# Patient Record
Sex: FEMALE | Race: BLACK OR AFRICAN AMERICAN | Employment: UNEMPLOYED | URBAN - METROPOLITAN AREA
[De-identification: names, ages, dates, MRNs, and addresses within clinical notes are randomized per-mention and may not be internally consistent; named-entity substitution may affect disease eponyms.]

---

## 2019-04-11 ENCOUNTER — OFFICE VISIT (OUTPATIENT)
Dept: FAMILY MEDICINE CLINIC | Age: 84
End: 2019-04-11

## 2019-04-11 VITALS
TEMPERATURE: 98.4 F | HEART RATE: 60 BPM | HEIGHT: 62 IN | RESPIRATION RATE: 18 BRPM | WEIGHT: 162 LBS | DIASTOLIC BLOOD PRESSURE: 72 MMHG | BODY MASS INDEX: 29.81 KG/M2 | OXYGEN SATURATION: 99 % | SYSTOLIC BLOOD PRESSURE: 140 MMHG

## 2019-04-11 DIAGNOSIS — F41.9 CHRONIC ANXIETY: ICD-10-CM

## 2019-04-11 DIAGNOSIS — S01.01XA LACERATION OF OCCIPITAL SCALP, INITIAL ENCOUNTER: ICD-10-CM

## 2019-04-11 DIAGNOSIS — Z76.89 ENCOUNTER TO ESTABLISH CARE: Primary | ICD-10-CM

## 2019-04-11 DIAGNOSIS — E03.9 ACQUIRED HYPOTHYROIDISM: ICD-10-CM

## 2019-04-11 DIAGNOSIS — I10 ESSENTIAL HYPERTENSION: ICD-10-CM

## 2019-04-11 RX ORDER — FLUOXETINE HYDROCHLORIDE 20 MG/1
20 CAPSULE ORAL
COMMUNITY
End: 2021-09-20 | Stop reason: SDUPTHER

## 2019-04-11 RX ORDER — LOSARTAN POTASSIUM 100 MG/1
100 TABLET ORAL
COMMUNITY
End: 2021-11-03 | Stop reason: SDUPTHER

## 2019-04-11 RX ORDER — DILTIAZEM HYDROCHLORIDE 240 MG/1
240 CAPSULE, COATED, EXTENDED RELEASE ORAL
COMMUNITY
End: 2021-11-03 | Stop reason: SDUPTHER

## 2019-04-11 RX ORDER — HYDRALAZINE HYDROCHLORIDE 25 MG/1
25 TABLET, FILM COATED ORAL
COMMUNITY
End: 2021-09-20 | Stop reason: SDUPTHER

## 2019-04-11 RX ORDER — FUROSEMIDE 20 MG/1
20 TABLET ORAL
COMMUNITY
Start: 2012-05-24 | End: 2021-11-03 | Stop reason: SDUPTHER

## 2019-04-11 RX ORDER — LORATADINE 10 MG/1
10 TABLET ORAL
COMMUNITY
End: 2022-01-26 | Stop reason: ALTCHOICE

## 2019-04-11 RX ORDER — LORAZEPAM 0.5 MG/1
0.5 TABLET ORAL
COMMUNITY
End: 2021-06-18 | Stop reason: SDUPTHER

## 2019-04-11 RX ORDER — LEVOTHYROXINE SODIUM 75 UG/1
75 TABLET ORAL
COMMUNITY
End: 2022-01-26 | Stop reason: SDUPTHER

## 2019-04-11 RX ORDER — GUAIFENESIN 100 MG/5ML
81 LIQUID (ML) ORAL
COMMUNITY

## 2019-04-11 RX ORDER — CALCIUM CARBONATE 750 MG/1
1 TABLET, CHEWABLE ORAL DAILY
COMMUNITY
End: 2021-01-05

## 2019-04-11 RX ORDER — ACETAMINOPHEN 325 MG/1
650 TABLET ORAL
COMMUNITY
Start: 2019-03-28 | End: 2021-01-05

## 2019-04-11 NOTE — PROGRESS NOTES
HISTORY OF PRESENT ILLNESS Nikhil Perry is a 80 y.o. female. Establish Care The history is provided by the patient. Pertinent negatives include no chest pain, no abdominal pain, no headaches and no shortness of breath. Ms. Deuce Martniez was brought here today by her son who would like her to move here, however she prefers to stay in [facility] where she has a primary care provider. She reports that about 2 weeks ago she fell and sustained a laceration to the back of her head with bleeding that apparently was not able to be controlled at the local hospital and she was transported to [facility]. She reports that a head CT scan there was unremarkable and she was advised to follow-up with her primary care provider. Mr#: [id number] Past Medical History:  
Diagnosis Date  COPD (chronic obstructive pulmonary disease) (HCC)  Depression  Hypertension  Hypertension  Shortness of breath  Thyroid disease  Vertigo Past Surgical History:  
Procedure Laterality Date  HX BREAST BIOPSY  HX CATARACT REMOVAL    
 HX HYSTERECTOMY  HX KNEE REPLACEMENT Left 2016 No family history on file. Allergies Allergen Reactions  Pcn [Penicillins] Rash Social History Tobacco Use Smoking Status Never Smoker Smokeless Tobacco Never Used Social History Substance and Sexual Activity Alcohol Use Yes  Frequency: Monthly or less Health Maintenance Review: 
Colonoscopy - 3-4 years ago, advised 10 year follow up Mammogram - 12/2018 Pap Smear - N/A 
DEXA scan - ? Glaucoma check - 3 years Immunizations: 
Tetanus - ? Influenza - Current PCV - 13 - Reported current PPSV - 23 - 
HZV - ? Medicare wellness evaluation -? Patient Active Problem List  
Diagnosis Code  Essential hypertension I10  
 Acquired hypothyroidism E03.9 Current Outpatient Medications:   acetaminophen (TYLENOL) 325 mg tablet, Take 650 mg by mouth., Disp: , Rfl:  
  aspirin 81 mg chewable tablet, Take 81 mg by mouth., Disp: , Rfl:  
  hydrALAZINE (APRESOLINE) 25 mg tablet, Take 25 mg by mouth., Disp: , Rfl:  
  LORazepam (ATIVAN) 0.5 mg tablet, Take 0.5 mg by mouth., Disp: , Rfl:  
  FLUoxetine (PROZAC) 20 mg capsule, Take 20 mg by mouth., Disp: , Rfl:  
  losartan (COZAAR) 100 mg tablet, Take 100 mg by mouth., Disp: , Rfl:  
  levothyroxine (SYNTHROID) 75 mcg tablet, Take 75 mcg by mouth., Disp: , Rfl:  
  furosemide (LASIX) 20 mg tablet, Take 20 mg by mouth., Disp: , Rfl:  
  loratadine (CLARITIN) 10 mg tablet, Take 10 mg by mouth., Disp: , Rfl:  
  folic acid-vit R4-MILAGROS U88 (FOLBIC) 2.5-25-2 mg tablet, TAKE ONE TABLET BY MOUTH TWICE DAILY, Disp: , Rfl:  
  dilTIAZem CD (CARTIA XT) 240 mg ER capsule, Take 240 mg by mouth., Disp: , Rfl:  
  calcium carbonate (TUMS) 300 mg (750 mg) chewable tablet, Take 1 Tab by mouth daily. , Disp: , Rfl:  
   
 
 
Review of Systems Constitutional: Negative for chills, fever and weight loss. HENT: Positive for hearing loss (left ear). Chronic hoarseness Eyes: Negative for blurred vision and double vision. Cataract surgery Respiratory: Negative for cough, shortness of breath and wheezing. Cardiovascular: Negative for chest pain, palpitations and leg swelling. Gastrointestinal: Negative for abdominal pain, blood in stool, constipation, diarrhea, heartburn, melena, nausea and vomiting. Genitourinary: Negative for dysuria and urgency. Musculoskeletal: Positive for joint pain (left knee arthroplasty 12/2016). Negative for myalgias. Skin: Negative for itching and rash. Neurological: Negative for dizziness, tingling, sensory change, focal weakness and headaches. Endo/Heme/Allergies: Negative for environmental allergies. Psychiatric/Behavioral: Negative for depression.  The patient is nervous/anxious. The patient does not have insomnia. Visit Vitals /72 (BP 1 Location: Left arm, BP Patient Position: Sitting) Pulse 60 Temp 98.4 °F (36.9 °C) (Oral) Resp 18 Ht 5' 2.01\" (1.575 m) Wt 162 lb (73.5 kg) SpO2 99% BMI 29.62 kg/m² Physical Exam  
Constitutional: She is oriented to person, place, and time. She appears well-developed and well-nourished. HENT:  
Right Ear: Tympanic membrane and ear canal normal.  
Left Ear: Tympanic membrane and ear canal normal.  
Mouth/Throat: Oropharynx is clear and moist.  
Area covered with a thick scab left occiput. Eyes: Pupils are equal, round, and reactive to light. Conjunctivae and EOM are normal.  
Neck: Neck supple. Cardiovascular: Normal rate, regular rhythm, normal heart sounds and intact distal pulses. Pulmonary/Chest: Effort normal and breath sounds normal.  
Abdominal: Soft. Bowel sounds are normal. She exhibits no mass. There is no tenderness. Musculoskeletal: She exhibits no edema. Neurological: She is alert and oriented to person, place, and time. She has normal reflexes. Skin: Skin is warm and dry. Psychiatric: She has a normal mood and affect. Her behavior is normal.  
Nursing note and vitals reviewed. Carli Kimo reports that she believes she has had recent lab studies with her primary care provider in [facility]. ASSESSMENT and PLAN 
  ICD-10-CM ICD-9-CM 1. Encounter to establish care Z76.89 V65.8 2. Essential hypertension I10 401.9 3. Acquired hypothyroidism E03.9 244.9 4. Chronic anxiety F41.9 300.00   
5. Laceration of occipital scalp, initial encounter S01. 01XA 873.0 She was advised that in my opinion she would be safe for decreasing her dose of lorazepam. 
She is currently taking 0.5 mg 3 times a day on a regular basis. I am concerned that this increases her risk of falling. And would recommend she start to taper downward by first taking only 0.5 mg twice daily. Further advised that I would be happy to provide primary care for her when she moved to this area but in the meantime believe she should maintain a relationship with her primary care provider where she lives in [place] especially since she lives alone there.

## 2019-04-11 NOTE — PATIENT INSTRUCTIONS
Well Visit, Over 72: Care Instructions Your Care Instructions Physical exams can help you stay healthy. Your doctor has checked your overall health and may have suggested ways to take good care of yourself. He or she also may have recommended tests. At home, you can help prevent illness with healthy eating, regular exercise, and other steps. Follow-up care is a key part of your treatment and safety. Be sure to make and go to all appointments, and call your doctor if you are having problems. It's also a good idea to know your test results and keep a list of the medicines you take. How can you care for yourself at home? · Reach and stay at a healthy weight. This will lower your risk for many problems, such as obesity, diabetes, heart disease, and high blood pressure. · Get at least 30 minutes of exercise on most days of the week. Walking is a good choice. You also may want to do other activities, such as running, swimming, cycling, or playing tennis or team sports. · Do not smoke. Smoking can make health problems worse. If you need help quitting, talk to your doctor about stop-smoking programs and medicines. These can increase your chances of quitting for good. · Protect your skin from too much sun. When you're outdoors from 10 a.m. to 4 p.m., stay in the shade or cover up with clothing and a hat with a wide brim. Wear sunglasses that block UV rays. Even when it's cloudy, put broad-spectrum sunscreen (SPF 30 or higher) on any exposed skin. · See a dentist one or two times a year for checkups and to have your teeth cleaned. · Wear a seat belt in the car. · Limit alcohol to 2 drinks a day for men and 1 drink a day for women. Too much alcohol can cause health problems. Follow your doctor's advice about when to have certain tests. These tests can spot problems early. For men and women · Cholesterol.  Your doctor will tell you how often to have this done based on your overall health and other things that can increase your risk for heart attack and stroke. · Blood pressure. Have your blood pressure checked during a routine doctor visit. Your doctor will tell you how often to check your blood pressure based on your age, your blood pressure results, and other factors. · Diabetes. Ask your doctor whether you should have tests for diabetes. · Vision. Experts recommend that you have yearly exams for glaucoma and other age-related eye problems. · Hearing. Tell your doctor if you notice any change in your hearing. You can have tests to find out how well you hear. · Colon cancer tests. Keep having colon cancer tests as your doctor recommends. You can have one of several types of tests. · Heart attack and stroke risk. At least every 4 to 6 years, you should have your risk for heart attack and stroke assessed. Your doctor uses factors such as your age, blood pressure, cholesterol, and whether you smoke or have diabetes to show what your risk for a heart attack or stroke is over the next 10 years. · Osteoporosis. Talk to your doctor about whether you should have a bone density test to find out whether you have thinning bones. Also ask your doctor about whether you should take calcium and vitamin D supplements. For women · Pap test and pelvic exam. You may no longer need a Pap test. Talk with your doctor about whether to stop or continue to have Pap tests. · Breast exam and mammogram. Ask how often you should have a mammogram, which is an X-ray of your breasts. A mammogram can spot breast cancer before it can be felt and when it is easiest to treat. · Thyroid disease. Talk to your doctor about whether to have your thyroid checked as part of a regular physical exam. Women have an increased chance of a thyroid problem. For men · Prostate exam. Talk to your doctor about whether you should have a blood test (called a PSA test) for prostate cancer.  Experts disagree on whether men should have this test. Some experts recommend that you discuss the benefits and risks of the test with your doctor. · Abdominal aortic aneurysm. Ask your doctor whether you should have a test to check for an aneurysm. You may need a test if you ever smoked or if your parent, brother, sister, or child has had an aneurysm. When should you call for help? Watch closely for changes in your health, and be sure to contact your doctor if you have any problems or symptoms that concern you. Where can you learn more? Go to http://nieves-kathrin.info/. Enter L620 in the search box to learn more about \"Well Visit, Over 65: Care Instructions. \" Current as of: March 28, 2018 Content Version: 11.9 © 0250-4241 [employer]. Care instructions adapted under license by [employer] (which disclaims liability or warranty for this information). If you have questions about a medical condition or this instruction, always ask your healthcare professional. [place] [address] any warranty or liability for your use of this information. High Blood Pressure: Care Instructions Overview It's normal for blood pressure to go up and down throughout the day. But if it stays up, you have high blood pressure. Another name for high blood pressure is hypertension. Despite what a lot of people think, high blood pressure usually doesn't cause headaches or make you feel dizzy or lightheaded. It usually has no symptoms. But it does increase your risk of stroke, heart attack, and other problems. You and your doctor will talk about your risks of these problems based on your blood pressure. Your doctor will give you a goal for your blood pressure. Your goal will be based on your health and your age. Lifestyle changes, such as eating healthy and being active, are always important to help lower blood pressure.  You might also take medicine to reach your blood pressure goal. 
 Follow-up care is a key part of your treatment and safety. Be sure to make and go to all appointments, and call your doctor if you are having problems. It's also a good idea to know your test results and keep a list of the medicines you take. How can you care for yourself at home? Medical treatment · If you stop taking your medicine, your blood pressure will go back up. You may take one or more types of medicine to lower your blood pressure. Be safe with medicines. Take your medicine exactly as prescribed. Call your doctor if you think you are having a problem with your medicine. · Talk to your doctor before you start taking aspirin every day. Aspirin can help certain people lower their risk of a heart attack or stroke. But taking aspirin isn't right for everyone, because it can cause serious bleeding. · See your doctor regularly. You may need to see the doctor more often at first or until your blood pressure comes down. · If you are taking blood pressure medicine, talk to your doctor before you take decongestants or anti-inflammatory medicine, such as ibuprofen. Some of these medicines can raise blood pressure. · Learn how to check your blood pressure at home. Lifestyle changes · Stay at a healthy weight. This is especially important if you put on weight around the waist. Losing even 10 pounds can help you lower your blood pressure. · If your doctor recommends it, get more exercise. Walking is a good choice. Bit by bit, increase the amount you walk every day. Try for at least 30 minutes on most days of the week. You also may want to swim, bike, or do other activities. · Avoid or limit alcohol. Talk to your doctor about whether you can drink any alcohol. · Try to limit how much sodium you eat to less than 2,300 milligrams (mg) a day. Your doctor may ask you to try to eat less than 1,500 mg a day.  
· Eat plenty of fruits (such as bananas and oranges), vegetables, legumes, whole grains, and low-fat dairy products. · Lower the amount of saturated fat in your diet. Saturated fat is found in animal products such as milk, cheese, and meat. Limiting these foods may help you lose weight and also lower your risk for heart disease. · Do not smoke. Smoking increases your risk for heart attack and stroke. If you need help quitting, talk to your doctor about stop-smoking programs and medicines. These can increase your chances of quitting for good. When should you call for help? Call 911 anytime you think you may need emergency care. This may mean having symptoms that suggest that your blood pressure is causing a serious heart or blood vessel problem. Your blood pressure may be over 180/120. 
 For example, call 911 if: 
  · You have symptoms of a heart attack. These may include: 
? Chest pain or pressure, or a strange feeling in the chest. 
? Sweating. ? Shortness of breath. ? Nausea or vomiting. ? Pain, pressure, or a strange feeling in the back, neck, jaw, or upper belly or in one or both shoulders or arms. ? Lightheadedness or sudden weakness. ? A fast or irregular heartbeat.  
  · You have symptoms of a stroke. These may include: 
? Sudden numbness, tingling, weakness, or loss of movement in your face, arm, or leg, especially on only one side of your body. ? Sudden vision changes. ? Sudden trouble speaking. ? Sudden confusion or trouble understanding simple statements. ? Sudden problems with walking or balance. ? A sudden, severe headache that is different from past headaches.  
  · You have severe back or belly pain.  
 Do not wait until your blood pressure comes down on its own. Get help right away. 
 Call your doctor now or seek immediate care if: 
  · Your blood pressure is much higher than normal (such as 180/120 or higher), but you don't have symptoms.  
  · You think high blood pressure is causing symptoms, such as: 
? Severe headache. 
? Blurry vision.  Watch closely for changes in your health, and be sure to contact your doctor if: 
  · Your blood pressure measures higher than your doctor recommends at least 2 times. That means the top number is higher or the bottom number is higher, or both.  
  · You think you may be having side effects from your blood pressure medicine. Where can you learn more? Go to http://nieves-kathrin.info/. Enter N830 in the search box to learn more about \"High Blood Pressure: Care Instructions. \" Current as of: July 22, 2018 Content Version: 11.9 © 5171-8951 [employer]. Care instructions adapted under license by [employer] (which disclaims liability or warranty for this information). If you have questions about a medical condition or this instruction, always ask your healthcare professional. [address] any warranty or liability for your use of this information. When You Are Overweight: Care Instructions Your Care Instructions If you're overweight, your doctor may recommend that you make changes in your eating and exercise habits. Being overweight can lead to serious health problems, such as high blood pressure, heart disease, type 2 diabetes, and arthritis, or it can make these problems worse. Eating a healthy diet and being more active can help you reach and stay at a healthy weight. You don't have to make huge changes all at once. Start by making small changes in your eating and exercise habits. To lose weight, you need to burn more calories than you take in. You can do this by eating healthy foods in reasonable amounts and becoming more active every day. Follow-up care is a key part of your treatment and safety. Be sure to make and go to all appointments, and call your doctor if you are having problems. It's also a good idea to know your test results and keep a list of the medicines you take. How can you care for yourself at home? · Improve your eating habits. You'll be more successful if you work on changing one eating habit at a time. All foods, if eaten in moderation, can be part of healthy eating. Remember to: 
? Eat a variety of foods from each food group. Include grains, vegetables, fruits, dairy, and protein foods. ? Limit foods high in fat, sugar, and calories. ? Eat slowly. And don't do anything else, such as watch TV, while you are eating. ? Pay attention to portion sizes. Put your food on a smaller plate. ? Plan your meals ahead of time. You'll be less likely to grab something that's not as healthy. · Get active. Regular activity can help you feel better, have more energy, and burn more calories. If you haven't been active, start slowly. Start with at least 30 minutes of moderate activity on most days of the week. Then gradually increase the amount of activity. Try for 60 or 90 minutes a day, at least 5 days a week. There are a lot of ways to fit activity into your life. You can: 
? Walk or bike to the store. Or walk with a friend, or walk the dog. 
? Mow the lawn, rake leaves, shovel snow, or do some gardening. ? Use the stairs instead of the elevator, at least for a few floors. · Change your thinking. Your thoughts have a lot to do with how you feel and what you do. When you're trying to reach a healthy weight, changing how you think about certain things may help. Here are some ideas: 
? Don't compare yourself to others. Healthy bodies come in all shapes and sizes. ? Pay attention to how hungry or full you feel. When you eat, be aware of why you're eating and how much you're eating. ? Focus on improving your health instead of dieting. Dieting almost never works over the long term. · Ask your doctor about other health professionals who can help you reach a healthy weight. ? A dietitian can help you make healthy changes in your diet. ?  An exercise specialist or  can help you develop a safe and effective exercise program. 
? A counselor or psychiatrist can help you cope with issues such as depression, anxiety, or family problems that can make it hard to focus on reaching a healthy weight. · Get support from your family, your doctor, your friends, a support groupand support yourself. Where can you learn more? Go to http://nieves-kathrin.info/. Enter K837 in the search box to learn more about \"When You Are Overweight: Care Instructions. \" Current as of: June 25, 2018 Content Version: 11.9 © 9020-5529 [email or web address]. Care instructions adapted under license by [employer] (which disclaims liability or warranty for this information). If you have questions about a medical condition or this instruction, always ask your healthcare professional. [place] [address] any warranty or liability for your use of this information. Learning About Low-Carbohydrate Diets for Weight Loss What is a low-carbohydrate diet? Low-carb diets avoid foods that are high in carbohydrate. These high-carb foods include pasta, bread, rice, cereal, fruits, and starchy vegetables. Instead, these diets usually have you eat foods that are high in fat and protein. Many people lose weight quickly on a low-carb diet. But the early weight loss is water. People on this diet often gain the weight back after they start eating carbs again. Not all diet plans are safe or work well. A lot of the evidence shows that low-carb diets aren't healthy. That's because these diets often don't include healthy foods like fruits and vegetables. Losing weight safely means balancing protein, fat, and carbs with every meal and snack. And low-carb diets don't always provide the vitamins, minerals, and fiber you need. If you have a serious medical condition, talk to your doctor before you try any diet.  These conditions include kidney disease, heart disease, type 2 diabetes, high cholesterol, and high blood pressure. If you are pregnant, it may not be safe for your baby if you are on a low-carb diet. How can you lose weight safely? You might have heard that a diet plan helped another person lose weight. But that doesn't mean that it will work for you. It is very hard to stay on a diet that includes lots of big changes in your eating habits. If you want to get to a healthy weight and stay there, making healthy lifestyle changes will often work better than dieting. These steps can help. · Make a plan for change. Work with your doctor to create a plan that is right for you. · See a dietitian. He or she can show you how to make healthy changes in your eating habits. · Manage stress. If you have a lot of stress in your life, it can be hard to focus on making healthy changes to your daily habits. · Track your food and activity. You are likely to do better at losing weight if you keep track of what you eat and what you do. Follow-up care is a key part of your treatment and safety. Be sure to make and go to all appointments, and call your doctor if you are having problems. It's also a good idea to know your test results and keep a list of the medicines you take. Where can you learn more? Go to http://nieves-kathrin.info/. Enter A121 in the search box to learn more about \"Learning About Low-Carbohydrate Diets for Weight Loss. \" Current as of: March 28, 2018 Content Version: 11.9 © 2649-7760 [employer]. Care instructions adapted under license by [employer] (which disclaims liability or warranty for this information). If you have questions about a medical condition or this instruction, always ask your healthcare professional. [place] [address] any warranty or liability for your use of this information. Low Sodium Diet (2,000 Milligram): Care Instructions Your Care Instructions Too much sodium causes your body to hold on to extra water. This can raise your blood pressure and force your heart and kidneys to work harder. In very serious cases, this could cause you to be put in the hospital. It might even be life-threatening. By limiting sodium, you will feel better and lower your risk of serious problems. The most common source of sodium is salt. People get most of the salt in their diet from canned, prepared, and packaged foods. Fast food and restaurant meals also are very high in sodium. Your doctor will probably limit your sodium to less than 2,000 milligrams (mg) a day. This limit counts all the sodium in prepared and packaged foods and any salt you add to your food. Follow-up care is a key part of your treatment and safety. Be sure to make and go to all appointments, and call your doctor if you are having problems. It's also a good idea to know your test results and keep a list of the medicines you take. How can you care for yourself at home? Read food labels · Read labels on cans and food packages. The labels tell you how much sodium is in each serving. Make sure that you look at the serving size. If you eat more than the serving size, you have eaten more sodium. · Food labels also tell you the Percent Daily Value for sodium. Choose products with low Percent Daily Values for sodium. · Be aware that sodium can come in forms other than salt, including monosodium glutamate (MSG), sodium citrate, and sodium bicarbonate (baking soda). MSG is often added to Asian food. When you eat out, you can sometimes ask for food without MSG or added salt. Buy low-sodium foods · Buy foods that are labeled \"unsalted\" (no salt added), \"sodium-free\" (less than 5 mg of sodium per serving), or \"low-sodium\" (less than 140 mg of sodium per serving). Foods labeled \"reduced-sodium\" and \"light sodium\" may still have too much sodium. Be sure to read the label to see how much sodium you are getting. · Buy fresh vegetables, or frozen vegetables without added sauces. Buy low-sodium versions of canned vegetables, soups, and other canned goods. Prepare low-sodium meals · Cut back on the amount of salt you use in cooking. This will help you adjust to the taste. Do not add salt after cooking. One teaspoon of salt has about 2,300 mg of sodium. · Take the salt shaker off the table. · Flavor your food with garlic, lemon juice, onion, vinegar, herbs, and spices. Do not use soy sauce, lite soy sauce, steak sauce, onion salt, garlic salt, celery salt, mustard, or ketchup on your food. · Use low-sodium salad dressings, sauces, and ketchup. Or make your own salad dressings and sauces without adding salt. · Use less salt (or none) when recipes call for it. You can often use half the salt a recipe calls for without losing flavor. Other foods such as rice, pasta, and grains do not need added salt. · Rinse canned vegetables, and cook them in fresh water. This removes somebut not allof the salt. · Avoid water that is naturally high in sodium or that has been treated with water softeners, which add sodium. Call your local water company to find out the sodium content of your water supply. If you buy bottled water, read the label and choose a sodium-free brand. Avoid high-sodium foods · Avoid eating: 
? Smoked, cured, salted, and canned meat, fish, and poultry. ? Ham, aparicio, hot dogs, and luncheon meats. ? Regular, hard, and processed cheese and regular peanut butter. ? Crackers with salted tops, and other salted snack foods such as pretzels, chips, and salted popcorn. ? Frozen prepared meals, unless labeled low-sodium. ? Canned and dried soups, broths, and bouillon, unless labeled sodium-free or low-sodium. ? Canned vegetables, unless labeled sodium-free or low-sodium. ? [place] fries, pizza, tacos, and other fast foods.  
? Pickles, olives, ketchup, and other condiments, especially soy sauce, unless labeled sodium-free or low-sodium. Where can you learn more? Go to http://nieves-kathrin.info/. Enter R372 in the search box to learn more about \"Low Sodium Diet (2,000 Milligram): Care Instructions. \" Current as of: March 28, 2018 Content Version: 11.9 © 1505-5741 [employer]. Care instructions adapted under license by [employer] (which disclaims liability or warranty for this information). If you have questions about a medical condition or this instruction, always ask your healthcare professional. [address] any warranty or liability for your use of this information.

## 2019-04-11 NOTE — PROGRESS NOTES
Elvira Goodrich is a 80 y.o. female (: 1935) presenting to address: Chief Complaint Patient presents with  New Patient [facility] Establish Care Vitals:  
 19 1259 BP: 140/72 Pulse: 60 Resp: 18 Temp: 98.4 °F (36.9 °C) TempSrc: Oral  
SpO2: 99% Weight: 162 lb (73.5 kg) Height: 5' 2.01\" (1.575 m) PainSc:   0 - No pain Hearing/Vision: No exam data present Learning Assessment:  
 
Learning Assessment 2019 PRIMARY LEARNER Patient HIGHEST LEVEL OF EDUCATION - PRIMARY LEARNER  DID NOT GRADUATE HIGH SCHOOL  
BARRIERS PRIMARY LEARNER NONE  
CO-LEARNER CAREGIVER No  
PRIMARY LANGUAGE ENGLISH  
LEARNER PREFERENCE PRIMARY OTHER (COMMENT) ANSWERED BY patient RELATIONSHIP SELF Depression Screening:  
 
3 most recent PHQ Screens 2019 Little interest or pleasure in doing things Not at all Feeling down, depressed, irritable, or hopeless Not at all Total Score PHQ 2 0 Fall Risk Assessment:  
 
Fall Risk Assessment, last 12 mths 2019 Able to walk? Yes Fall in past 12 months? Yes Fall with injury? Yes  
Number of falls in past 12 months 2 Fall Risk Score 3 Abuse Screening:  
 
Abuse Screening Questionnaire 2019 Do you ever feel afraid of your partner? Bushra Baez Are you in a relationship with someone who physically or mentally threatens you? Bushra Baez Is it safe for you to go home? Ariel Berumen Coordination of Care Questionaire: 1. Have you been to the ER, urgent care clinic since your last visit? Hospitalized since your last visit? NO 
 
2. Have you seen or consulted any other health care providers outside of the [address] [place] [address] since your last visit? Include any pap smears or colon screening. NO Advanced Directive: 1. Do you have an Advanced Directive? NO 
 
2. Would you like information on Advanced Directives?  NO

## 2020-06-23 LAB — SARS-COV-2 AB, IGG, CORG1M: NEGATIVE

## 2021-01-05 ENCOUNTER — VIRTUAL VISIT (OUTPATIENT)
Dept: FAMILY MEDICINE CLINIC | Age: 86
End: 2021-01-05

## 2021-01-05 DIAGNOSIS — Z00.00 MEDICARE ANNUAL WELLNESS VISIT, SUBSEQUENT: Primary | ICD-10-CM

## 2021-01-05 DIAGNOSIS — R41.3 MEMORY PROBLEM: ICD-10-CM

## 2021-01-05 DIAGNOSIS — E03.9 ACQUIRED HYPOTHYROIDISM: ICD-10-CM

## 2021-01-05 DIAGNOSIS — I10 ESSENTIAL HYPERTENSION: ICD-10-CM

## 2021-01-05 RX ORDER — CHOLECALCIFEROL (VITAMIN D3) 50 MCG
1000 CAPSULE ORAL DAILY
COMMUNITY
End: 2021-03-29

## 2021-01-05 NOTE — PROGRESS NOTES
Kaleb Torres is a 80 y.o. female (: 1935) presenting to address:    Chief Complaint   Patient presents with    Memory Loss       There were no vitals filed for this visit. Hearing/Vision:   No exam data present    Learning Assessment:     Learning Assessment 2019   PRIMARY LEARNER Patient   HIGHEST LEVEL OF EDUCATION - PRIMARY LEARNER  DID NOT GRADUATE HIGH SCHOOL   BARRIERS PRIMARY LEARNER NONE   CO-LEARNER CAREGIVER No   PRIMARY LANGUAGE ENGLISH   LEARNER PREFERENCE PRIMARY OTHER (COMMENT)   ANSWERED BY patient    RELATIONSHIP SELF     Depression Screening:     3 most recent PHQ Screens 2019   Little interest or pleasure in doing things Not at all   Feeling down, depressed, irritable, or hopeless Not at all   Total Score PHQ 2 0     Fall Risk Assessment:     Fall Risk Assessment, last 12 mths 2019   Able to walk? Yes   Fall in past 12 months? Yes   Number of falls in past 12 months 2   Fall with injury? 1     Abuse Screening:     Abuse Screening Questionnaire 2019   Do you ever feel afraid of your partner? N   Are you in a relationship with someone who physically or mentally threatens you? N   Is it safe for you to go home? Y     Coordination of Care Questionaire:   1. Have you been to the ER, urgent care clinic since your last visit? Hospitalized since your last visit? YES, knee surgery (left) in [place]    2. Have you seen or consulted any other health care providers outside of the [address] [place] [address] since your last visit? Include any pap smears or colon screening. YES, ortho    Advanced Directive:   1. Do you have an Advanced Directive? NO    2. Would you like information on Advanced Directives?  YES

## 2021-01-07 ENCOUNTER — OFFICE VISIT (OUTPATIENT)
Dept: FAMILY MEDICINE CLINIC | Age: 86
End: 2021-01-07
Payer: MEDICARE

## 2021-01-07 ENCOUNTER — APPOINTMENT (OUTPATIENT)
Dept: FAMILY MEDICINE CLINIC | Age: 86
End: 2021-01-07

## 2021-01-07 VITALS
WEIGHT: 148.8 LBS | HEART RATE: 60 BPM | DIASTOLIC BLOOD PRESSURE: 70 MMHG | OXYGEN SATURATION: 98 % | SYSTOLIC BLOOD PRESSURE: 130 MMHG | TEMPERATURE: 98.4 F | BODY MASS INDEX: 27.38 KG/M2 | HEIGHT: 62 IN | RESPIRATION RATE: 14 BRPM

## 2021-01-07 DIAGNOSIS — I10 ESSENTIAL HYPERTENSION: ICD-10-CM

## 2021-01-07 DIAGNOSIS — Z00.00 MEDICARE ANNUAL WELLNESS VISIT, SUBSEQUENT: Primary | ICD-10-CM

## 2021-01-07 DIAGNOSIS — E03.9 ACQUIRED HYPOTHYROIDISM: ICD-10-CM

## 2021-01-07 DIAGNOSIS — F41.9 CHRONIC ANXIETY: ICD-10-CM

## 2021-01-07 DIAGNOSIS — R41.3 MEMORY LOSS OF UNKNOWN CAUSE: ICD-10-CM

## 2021-01-07 DIAGNOSIS — H93.A2 PULSATILE TINNITUS OF LEFT EAR: ICD-10-CM

## 2021-01-07 PROCEDURE — G8510 SCR DEP NEG, NO PLAN REQD: HCPCS | Performed by: FAMILY MEDICINE

## 2021-01-07 PROCEDURE — G8754 DIAS BP LESS 90: HCPCS | Performed by: FAMILY MEDICINE

## 2021-01-07 PROCEDURE — G8419 CALC BMI OUT NRM PARAM NOF/U: HCPCS | Performed by: FAMILY MEDICINE

## 2021-01-07 PROCEDURE — 1090F PRES/ABSN URINE INCON ASSESS: CPT | Performed by: FAMILY MEDICINE

## 2021-01-07 PROCEDURE — 1101F PT FALLS ASSESS-DOCD LE1/YR: CPT | Performed by: FAMILY MEDICINE

## 2021-01-07 PROCEDURE — G8400 PT W/DXA NO RESULTS DOC: HCPCS | Performed by: FAMILY MEDICINE

## 2021-01-07 PROCEDURE — G8752 SYS BP LESS 140: HCPCS | Performed by: FAMILY MEDICINE

## 2021-01-07 PROCEDURE — G8536 NO DOC ELDER MAL SCRN: HCPCS | Performed by: FAMILY MEDICINE

## 2021-01-07 PROCEDURE — G0439 PPPS, SUBSEQ VISIT: HCPCS | Performed by: FAMILY MEDICINE

## 2021-01-07 PROCEDURE — G8427 DOCREV CUR MEDS BY ELIG CLIN: HCPCS | Performed by: FAMILY MEDICINE

## 2021-01-07 PROCEDURE — 99213 OFFICE O/P EST LOW 20 MIN: CPT | Performed by: FAMILY MEDICINE

## 2021-01-07 NOTE — PROGRESS NOTES
Jazlyn Begum is a 80 y.o. female (: 1935) presenting to address:    Chief Complaint   Patient presents with    Annual Wellness Visit     c/o memory loss       Vitals:    21 1301   BP: 130/70   Pulse: 60   Resp: 14   Temp: 98.4 °F (36.9 °C)   TempSrc: Temporal   SpO2: 98%   Weight: 148 lb 12.8 oz (67.5 kg)   Height: 5' 2.01\" (1.575 m)   PainSc:   0 - No pain       Hearing/Vision:   No exam data present    Learning Assessment:     Learning Assessment 2019   PRIMARY LEARNER Patient   HIGHEST LEVEL OF EDUCATION - PRIMARY LEARNER  DID NOT GRADUATE HIGH SCHOOL   BARRIERS PRIMARY LEARNER NONE   CO-LEARNER CAREGIVER No   PRIMARY LANGUAGE ENGLISH   LEARNER PREFERENCE PRIMARY OTHER (COMMENT)   ANSWERED BY patient    RELATIONSHIP SELF     Depression Screening:     3 most recent PHQ Screens 2021   Little interest or pleasure in doing things Not at all   Feeling down, depressed, irritable, or hopeless Not at all   Total Score PHQ 2 0     Fall Risk Assessment:     Fall Risk Assessment, last 12 mths 2021   Able to walk? Yes   Fall in past 12 months? 0   Do you feel unsteady? 0   Are you worried about falling 0   Number of falls in past 12 months -   Fall with injury? -     Abuse Screening:     Abuse Screening Questionnaire 2019   Do you ever feel afraid of your partner? N   Are you in a relationship with someone who physically or mentally threatens you? N   Is it safe for you to go home? Y     Coordination of Care Questionaire:   1. Have you been to the ER, urgent care clinic since your last visit? Hospitalized since your last visit? NO    2. Have you seen or consulted any other health care providers outside of the [address] [place] [address] since your last visit? Include any pap smears or colon screening. NO    Advanced Directive:   1. Do you have an Advanced Directive? NO    2. Would you like information on Advanced Directives?  YES

## 2021-01-07 NOTE — PROGRESS NOTES
HISTORY OF PRESENT ILLNESS  Lanny Amaya is a 80 y.o. female. She presents for follow-up with a history of hypertension and acquired hypothyroidism as well as concern about memory loss and for Medicare wellness evaluation. She and her son report that she has noted a marked change in her memory which seems to have started sometime over the past 2 months. She actually lives alone in [facility] and so her son has not had direct observation. She reports a dramatic pulsatile tinnitus type symptoms involving primarily the left ear and some associated decrease in hearing on that side. Mr#: [id number]      Past Medical History:   Diagnosis Date    COPD [facility]ch[facility] obstructive [facility] disease) ([facility])     Depression     Hypertension     Hypertension     Shortness of breath     Thyroid disease     Vertigo        Past Surgical History:   Procedure Laterality Date    HX BREAST BIOPSY      HX CATARACT REMOVAL      HX HYSTERECTOMY      HX KNEE REPLACEMENT Left 2016       Family History   Problem Relation Age of Onset    Hypertension Mother     Hypertension Father     Breast Cancer Child        Allergies   Allergen Reactions    Pcn [Penicillins] Rash       Social History     Tobacco Use   Smoking Status Never Smoker   Smokeless Tobacco Never Used       Social History     Substance and Sexual Activity   Alcohol Use Yes    Frequency: Monthly or less            Patient Active Problem List   Diagnosis Code    Essential hypertension I10    Acquired hypothyroidism E03.9         Current Outpatient Medications:     omega 3-dha-epa-fish oil (Fish Oil) 100-160-1,000 mg cap, Take 1,000 mg by mouth daily. , Disp: , Rfl:     aspirin 81 mg chewable tablet, Take 81 mg by mouth., Disp: , Rfl:     hydrALAZINE (APRESOLINE) 25 mg tablet, Take 25 mg by mouth., Disp: , Rfl:     LORazepam (ATIVAN) 0.5 mg tablet, Take 0.5 mg by mouth., Disp: , Rfl:     FLUoxetine (PROZAC) 20 mg capsule, Take 20 mg by mouth., Disp: , Rfl:   losartan (COZAAR) 100 mg tablet, Take 100 mg by mouth., Disp: , Rfl:     levothyroxine (SYNTHROID) 75 mcg tablet, Take 75 mcg by mouth., Disp: , Rfl:     furosemide (LASIX) 20 mg tablet, Take 20 mg by mouth., Disp: , Rfl:     loratadine (CLARITIN) 10 mg tablet, Take 10 mg by mouth., Disp: , Rfl:     folic acid-vit F3-SGB X97 (FOLBIC) 2.5-25-2 mg tablet, TAKE ONE TABLET BY MOUTH TWICE DAILY, Disp: , Rfl:     dilTIAZem CD (CARTIA XT) 240 mg ER capsule, Take 240 mg by mouth., Disp: , Rfl:         Review of Systems   Constitutional: Negative for chills, fever and weight loss. HENT: Positive for tinnitus (pulsatile tinnitus right ear). Negative for congestion, ear pain, hearing loss and sore throat. Eyes: Negative for blurred vision and double vision. Eye exam 1 year ago   Respiratory: Negative for cough, shortness of breath and wheezing. Cardiovascular: Negative for chest pain, palpitations and leg swelling. Gastrointestinal: Positive for constipation ( Treats this successfully with prune juice). Negative for abdominal pain, blood in stool, diarrhea, heartburn, melena, nausea and vomiting. Genitourinary: Negative for dysuria and urgency. Musculoskeletal: Positive for joint pain (episodic left knee pain, S/P arthroplasty). Negative for myalgias. Skin: Negative for itching and rash. Neurological: Negative for dizziness, tingling, sensory change, focal weakness and headaches. Endo/Heme/Allergies: Positive for environmental allergies. Psychiatric/Behavioral: Negative for depression. The patient is nervous/anxious. The patient does not have insomnia. Memory difficulty x 1-2 months     Visit Vitals  /70 (BP 1 Location: Left arm, BP Patient Position: Sitting)   Pulse 60   Temp 98.4 °F (36.9 °C) (Temporal)   Resp 14   Ht 5' 2.01\" (1.575 m)   Wt 148 lb 12.8 oz (67.5 kg)   SpO2 98%   BMI 27.21 kg/m²       Physical Exam  Vitals signs and nursing note reviewed.    Constitutional: General: She is not in acute distress. Appearance: Normal appearance. She is not ill-appearing. HENT:      Head: Normocephalic. Right Ear: Tympanic membrane, ear canal and external ear normal.      Left Ear: Tympanic membrane, ear canal and external ear normal.   Eyes:      Extraocular Movements: Extraocular movements intact. Conjunctiva/sclera: Conjunctivae normal.      Pupils: Pupils are equal, round, and reactive to light. Neck:      Musculoskeletal: Neck supple. Vascular: No carotid bruit. Cardiovascular:      Rate and Rhythm: Normal rate and regular rhythm. Pulses: Normal pulses. Heart sounds: Murmur ( 2/6 systolic murmur without radiation at the right proximal sternal border) present. Pulmonary:      Effort: Pulmonary effort is normal.      Breath sounds: Normal breath sounds. Abdominal:      Palpations: Abdomen is soft. Tenderness: There is no abdominal tenderness. Musculoskeletal:         General: No deformity. Right lower leg: No edema. Left lower leg: No edema. Comments: Ambulates with some difficulty, seems stiff and somewhat unsteady, has difficulty moving from sitting to standing   Skin:     General: Skin is warm and dry. Neurological:      Mental Status: She is alert and oriented to person, place, and time. Comments: The patient is oriented as to person place and time although she does not know today's date. Her fund of knowledge includes knowing the name of the president and she actually chuckled when asked about this and seems to be aware of current affairs. She does seem to have forgotten that she had knee replacement surgery on the left, in fact her son reports that she had to have a follow-up procedure apparently because of a defective replacement appliance. She does repeat herself and repeats questions.    Psychiatric:         Mood and Affect: Mood normal.         Behavior: Behavior normal.         ASSESSMENT and PLAN ICD-10-CM ICD-9-CM    1. Medicare annual wellness visit, subsequent  Z00.00 V70.0    2. Memory loss of unknown cause  Z87.7 213.07 METABOLIC PANEL, COMPREHENSIVE      CBC WITH AUTOMATED DIFF      CULTURE, URINE      VITAMIN B12      MRI BRAIN W CONT   3. Pulsatile tinnitus of left ear  H93. A2 388.30 MRI BRAIN W CONT   4. Essential hypertension  J96 164.8 METABOLIC PANEL, COMPREHENSIVE      LIPID PANEL      URINALYSIS W/ RFLX MICROSCOPIC   5. Acquired hypothyroidism  E03.9 244.9 TSH 3RD GENERATION      T4, FREE   6. Chronic anxiety  F41.9 300.00      Lab studies today, further disposition pending lab results if indicated  Brain MRI  Continue current medications for now  Return for follow-up here in 2 weeks, sooner with any problems          Date of Service:  2021   Patient Name:  Rebecca Bingham   Patient :  1935     This is a Subsequent Medicare Annual Wellness Visit providing Personalized Prevention Plan Services (PPPS) (Performed 12 months after initial AWV and PPPS )     I have reviewed the patient's medical history in detail and updated the computerized patient record. History     Past Medical History:   Diagnosis Date    COPD [facility]chronic obstructive pulmonary disease) ([facility] [address]     Depression     Hypertension     Hypertension     Shortness of breath     Thyroid disease     Vertigo       Past Surgical History:   Procedure Laterality Date    HX BREAST BIOPSY      HX CATARACT REMOVAL      HX HYSTERECTOMY      HX KNEE REPLACEMENT Left      Current Outpatient Medications   Medication Sig Dispense Refill    omega 3-dha-epa-fish oil (Fish Oil) 100-160-1,000 mg cap Take 1,000 mg by mouth daily.  aspirin 81 mg chewable tablet Take 81 mg by mouth.  hydrALAZINE (APRESOLINE) 25 mg tablet Take 25 mg by mouth.  LORazepam (ATIVAN) 0.5 mg tablet Take 0.5 mg by mouth.  FLUoxetine (PROZAC) 20 mg capsule Take 20 mg by mouth.       losartan (COZAAR) 100 mg tablet Take 100 mg by mouth.      levothyroxine (SYNTHROID) 75 mcg tablet Take 75 mcg by mouth.  furosemide (LASIX) 20 mg tablet Take 20 mg by mouth.  loratadine (CLARITIN) 10 mg tablet Take 10 mg by mouth.  folic acid-vit Y1-OLT W68 (FOLBIC) 2.5-25-2 mg tablet TAKE ONE TABLET BY MOUTH TWICE DAILY      dilTIAZem CD (CARTIA XT) 240 mg ER capsule Take 240 mg by mouth. Allergies   Allergen Reactions    Pcn [Penicillins] Rash     Family History   Problem Relation Age of Onset    Hypertension Mother     Hypertension Father     Breast Cancer Child      Social History     Tobacco Use    Smoking status: Never Smoker    Smokeless tobacco: Never Used   Substance Use Topics    Alcohol use: Yes     Frequency: Monthly or less     Patient Active Problem List   Diagnosis Code    Essential hypertension I10    Acquired hypothyroidism E03.9       Living situation:   -- Lives  alone  -- Stairs in home no    Diet, Lifestyle: nonsmoker    Exercise level: very active    Depression Risk Factor Screening:     3 most recent PHQ Screens 4/11/2019   Little interest or pleasure in doing things Not at all   Feeling down, depressed, irritable, or hopeless Not at all   Total Score PHQ 2 0     Alcohol Risk Factor Screening:   Do you average more than 1 drink per night or more than 7 drinks a week:  No    On any one occasion in the past three months have you have had more than 3 drinks containing alcohol:  No    Functional Ability and Level of Safety:     Hearing Loss   Decreased hearing on the left    Activities of Daily Living   Self-care. Requires assistance with: no ADLs    Fall Risk     Fall Risk Assessment, last 12 mths 4/11/2019   Able to walk? Yes   Fall in past 12 months? Yes   Number of falls in past 12 months 2   Fall with injury? 1     Abuse Screen   Patient is not abused    Review of Systems   Review of systems reported in the separate problem-oriented documentation.       Physical Examination   Physical exam reported in the separate problem-oriented documentation. Evaluation of Cognitive Function:  Mood/affect:  neutral  Appearance: age appropriate  Family member/caregiver input: Son    Patient Care Team:  Rebeca Jackson MD as PCP - General (Family Medicine)  Rebeca Jackson MD as PCP - [facility] Empaneled Provider    Advice/Counseling   Education and counseling provided:  Advance directives counseling/discussion      Assessment/Plan       ICD-10-CM ICD-9-CM    1. Medicare annual wellness visit, subsequent  Z00.00 V70.0    2. Memory loss of unknown cause  D67.2 742.58 METABOLIC PANEL, COMPREHENSIVE      CBC WITH AUTOMATED DIFF      CULTURE, URINE      VITAMIN B12      MRI BRAIN W CONT   3. Pulsatile tinnitus of left ear  H93. A2 388.30 MRI BRAIN W CONT   4. Essential hypertension  K10 880.8 METABOLIC PANEL, COMPREHENSIVE      LIPID PANEL      URINALYSIS W/ RFLX MICROSCOPIC   5. Acquired hypothyroidism  E03.9 244.9 TSH 3RD GENERATION      T4, FREE   6. Chronic anxiety  F41.9 300.00      Health maintenance:      5-year personalized preventative services plan:    Complete and return advance directives form  Influenza immunization-reported current  Glaucoma screening recommended every 2 years  Osteoporosis screening-deferred  Medicare wellness evaluation January 2022        Gaviota Jamil was provided with a 5-year personalized preventative services plan included in the after visit summary. Mattie Mares MD      PLEASE NOTE:   This document has been produced using voice recognition software. Unrecognized errors in transcription may be present.

## 2021-01-07 NOTE — PATIENT INSTRUCTIONS
Complete and return advance directives form Influenza immunization-reported current Glaucoma screening recommended every 2 years Osteoporosis screening-deferred Medicare wellness evaluation January 2022 Lab studies today, further disposition pending lab results if indicated Brain MRI Continue current medications for now Return for follow-up here in 2 weeks, sooner with any problems

## 2021-01-09 LAB
ALBUMIN SERPL-MCNC: 4.4 G/DL (ref 3.6–4.6)
ALBUMIN/GLOB SERPL: 1.6 {RATIO} (ref 1.2–2.2)
ALP SERPL-CCNC: 95 IU/L (ref 39–117)
ALT SERPL-CCNC: 8 IU/L (ref 0–32)
AST SERPL-CCNC: 17 IU/L (ref 0–40)
BACTERIA UR CULT: ABNORMAL
BASOPHILS # BLD AUTO: 0 X10E3/UL (ref 0–0.2)
BASOPHILS NFR BLD AUTO: 0 %
BILIRUB SERPL-MCNC: 0.4 MG/DL (ref 0–1.2)
BUN SERPL-MCNC: 9 MG/DL (ref 8–27)
BUN/CREAT SERPL: 11 (ref 12–28)
CALCIUM SERPL-MCNC: 10 MG/DL (ref 8.7–10.3)
CHLORIDE SERPL-SCNC: 102 MMOL/L (ref 96–106)
CHOLEST SERPL-MCNC: 185 MG/DL (ref 100–199)
CO2 SERPL-SCNC: 24 MMOL/L (ref 20–29)
CREAT SERPL-MCNC: 0.82 MG/DL (ref 0.57–1)
EOSINOPHIL # BLD AUTO: 0 X10E3/UL (ref 0–0.4)
EOSINOPHIL NFR BLD AUTO: 0 %
ERYTHROCYTE [DISTWIDTH] IN BLOOD BY AUTOMATED COUNT: 12.6 % (ref 11.7–15.4)
GLOBULIN SER CALC-MCNC: 2.7 G/DL (ref 1.5–4.5)
GLUCOSE SERPL-MCNC: 95 MG/DL (ref 65–99)
GLUCOSE UR QL: NORMAL
HCT VFR BLD AUTO: 39.5 % (ref 34–46.6)
HDLC SERPL-MCNC: 81 MG/DL
HGB BLD-MCNC: 13.1 G/DL (ref 11.1–15.9)
IMM GRANULOCYTES # BLD AUTO: 0 X10E3/UL (ref 0–0.1)
IMM GRANULOCYTES NFR BLD AUTO: 0 %
INTERPRETATION, 910389: NORMAL
KETONES UR QL STRIP: NORMAL
LDLC SERPL CALC-MCNC: 94 MG/DL (ref 0–99)
LYMPHOCYTES # BLD AUTO: 0.9 X10E3/UL (ref 0.7–3.1)
LYMPHOCYTES NFR BLD AUTO: 13 %
MCH RBC QN AUTO: 30.8 PG (ref 26.6–33)
MCHC RBC AUTO-ENTMCNC: 33.2 G/DL (ref 31.5–35.7)
MCV RBC AUTO: 93 FL (ref 79–97)
MONOCYTES # BLD AUTO: 0.5 X10E3/UL (ref 0.1–0.9)
MONOCYTES NFR BLD AUTO: 7 %
NEUTROPHILS # BLD AUTO: 5.6 X10E3/UL (ref 1.4–7)
NEUTROPHILS NFR BLD AUTO: 80 %
PH UR STRIP: NORMAL [PH]
PLATELET # BLD AUTO: 288 X10E3/UL (ref 150–450)
POTASSIUM SERPL-SCNC: 3.9 MMOL/L (ref 3.5–5.2)
PROT SERPL-MCNC: 7.1 G/DL (ref 6–8.5)
PROT UR QL STRIP: NORMAL
RBC # BLD AUTO: 4.25 X10E6/UL (ref 3.77–5.28)
SODIUM SERPL-SCNC: 141 MMOL/L (ref 134–144)
SP GR UR: NORMAL
T4 FREE SERPL-MCNC: 1.14 NG/DL (ref 0.82–1.77)
TRIGL SERPL-MCNC: 54 MG/DL (ref 0–149)
TSH SERPL DL<=0.005 MIU/L-ACNC: 1.47 UIU/ML (ref 0.45–4.5)
VIT B12 SERPL-MCNC: >2000 PG/ML (ref 232–1245)
VLDLC SERPL CALC-MCNC: 10 MG/DL (ref 5–40)
WBC # BLD AUTO: 6.9 X10E3/UL (ref 3.4–10.8)

## 2021-01-10 NOTE — PROGRESS NOTES
Please advise the patient/her family that her lab results are unremarkable except that her vitamin B12 level is too high. If she is receiving vitamin B12 injections or taking oral vitamin B12 she should stop for now.

## 2021-01-11 NOTE — PROGRESS NOTES
Informed pt's daughter, Lizette Abdi, of lab results; states pt only takes [place] that has Vit B12, but no VitB12 injections or oral tab.

## 2021-01-13 LAB
BACTERIA UR CULT: NORMAL
GLUCOSE UR QL: NORMAL
KETONES UR QL STRIP: NORMAL
PH UR STRIP: NORMAL [PH]
PROT UR QL STRIP: NORMAL
SP GR UR: NORMAL

## 2021-01-18 ENCOUNTER — TELEPHONE (OUTPATIENT)
Dept: FAMILY MEDICINE CLINIC | Age: 86
End: 2021-01-18

## 2021-01-18 DIAGNOSIS — H93.A2 PULSATILE TINNITUS OF LEFT EAR: Primary | ICD-10-CM

## 2021-01-18 NOTE — TELEPHONE ENCOUNTER
Informed pt's daughter, Emily Lan, of MRI results another MRI was ordered and they should expect a call central scheduling; pt has f/u appt w/PCP on 1/21/2021.

## 2021-01-18 NOTE — TELEPHONE ENCOUNTER
Please advise the patient/her family that the brain MRI showed a possible mass lesion in the area of the left carotid artery and an MRI of the neck was recommended for better evaluation. The neck MRI has been and they should expect to get a call about scheduling it.   They should check back with us if they have received a call by the end of the week

## 2021-01-22 ENCOUNTER — TELEPHONE (OUTPATIENT)
Dept: FAMILY MEDICINE CLINIC | Age: 86
End: 2021-01-22

## 2021-01-22 NOTE — TELEPHONE ENCOUNTER
Spoke w/Bill from [address] [place] [address] and the order for the MRA of neck needed to be changed to MRI of neck; gave a verbal order.

## 2021-01-26 ENCOUNTER — OFFICE VISIT (OUTPATIENT)
Dept: FAMILY MEDICINE CLINIC | Age: 86
End: 2021-01-26
Payer: MEDICARE

## 2021-01-26 VITALS
SYSTOLIC BLOOD PRESSURE: 104 MMHG | HEART RATE: 55 BPM | BODY MASS INDEX: 27.86 KG/M2 | WEIGHT: 151.4 LBS | TEMPERATURE: 97.2 F | OXYGEN SATURATION: 99 % | DIASTOLIC BLOOD PRESSURE: 64 MMHG | RESPIRATION RATE: 14 BRPM | HEIGHT: 62 IN

## 2021-01-26 DIAGNOSIS — H93.A2 PULSATILE TINNITUS OF LEFT EAR: Primary | ICD-10-CM

## 2021-01-26 DIAGNOSIS — R22.1 NECK MASS: ICD-10-CM

## 2021-01-26 PROCEDURE — G8752 SYS BP LESS 140: HCPCS | Performed by: FAMILY MEDICINE

## 2021-01-26 PROCEDURE — G8400 PT W/DXA NO RESULTS DOC: HCPCS | Performed by: FAMILY MEDICINE

## 2021-01-26 PROCEDURE — G8510 SCR DEP NEG, NO PLAN REQD: HCPCS | Performed by: FAMILY MEDICINE

## 2021-01-26 PROCEDURE — 1101F PT FALLS ASSESS-DOCD LE1/YR: CPT | Performed by: FAMILY MEDICINE

## 2021-01-26 PROCEDURE — G8754 DIAS BP LESS 90: HCPCS | Performed by: FAMILY MEDICINE

## 2021-01-26 PROCEDURE — G8419 CALC BMI OUT NRM PARAM NOF/U: HCPCS | Performed by: FAMILY MEDICINE

## 2021-01-26 PROCEDURE — 99214 OFFICE O/P EST MOD 30 MIN: CPT | Performed by: FAMILY MEDICINE

## 2021-01-26 PROCEDURE — 1090F PRES/ABSN URINE INCON ASSESS: CPT | Performed by: FAMILY MEDICINE

## 2021-01-26 PROCEDURE — G8536 NO DOC ELDER MAL SCRN: HCPCS | Performed by: FAMILY MEDICINE

## 2021-01-26 PROCEDURE — G8427 DOCREV CUR MEDS BY ELIG CLIN: HCPCS | Performed by: FAMILY MEDICINE

## 2021-01-26 NOTE — PROGRESS NOTES
HISTORY OF PRESENT ILLNESS  Elizabeth Bullard is a 80 y.o. female. 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 Corresponds with the mass seen and described on brain MRI from 1/14/2021. Separate from the carotid bifurcation, displaces the left ICA medially. Location somewhat favors a nerve sheath tumor such as vagal schwannoma or sympathetic chain schwannoma over glomus vagale paraganglioma, both are possible. -Separate from the deep lobe of the parotid gland, does not appear to be parotid origin.  -Not typical appearance for malignant lymph node, no other enlarged lymph nodes in the neck. Mr#: [id number]      Patient Active Problem List   Diagnosis Code    Essential hypertension I10    Acquired hypothyroidism E03.9         Current Outpatient Medications:     omega 3-dha-epa-fish oil (Fish Oil) 100-160-1,000 mg cap, Take 1,000 mg by mouth daily. , Disp: , Rfl:     aspirin 81 mg chewable tablet, Take 81 mg by mouth., Disp: , Rfl:     hydrALAZINE (APRESOLINE) 25 mg tablet, Take 25 mg by mouth., Disp: , Rfl:     LORazepam (ATIVAN) 0.5 mg tablet, Take 0.5 mg by mouth., Disp: , Rfl:     FLUoxetine (PROZAC) 20 mg capsule, Take 20 mg by mouth., Disp: , Rfl:     losartan (COZAAR) 100 mg tablet, Take 100 mg by mouth., Disp: , Rfl:     levothyroxine (SYNTHROID) 75 mcg tablet, Take 75 mcg by mouth., Disp: , Rfl:     furosemide (LASIX) 20 mg tablet, Take 20 mg by mouth., Disp: , Rfl:     loratadine (CLARITIN) 10 mg tablet, Take 10 mg by mouth., Disp: , Rfl:     folic acid-vit L2-RII W65 (FOLBIC) 2.5-25-2 mg tablet, TAKE ONE TABLET BY MOUTH TWICE DAILY, Disp: , Rfl:     dilTIAZem CD (CARTIA XT) 240 mg ER capsule, Take 240 mg by mouth., Disp: , Rfl:      Allergies   Allergen Reactions    Pcn [Penicillins] Rash       Review of Systems Constitutional: Negative for fever. HENT:        Pulsatile tinnitus left   Respiratory: Negative for shortness of breath. Cardiovascular: Negative for chest pain and palpitations. Neurological: Negative for dizziness and headaches. Psychiatric/Behavioral: Positive for memory loss. Visit Vitals  /64 (BP 1 Location: Left arm, BP Patient Position: Sitting)   Pulse (!) 55   Temp 97.2 °F (36.2 °C) (Temporal)   Resp 14   Ht 5' 2.01\" (1.575 m)   Wt 151 lb 6.4 oz (68.7 kg)   SpO2 99%   BMI 27.68 kg/m²       Physical Exam  Vitals signs and nursing note reviewed. Constitutional:       General: She is not in acute distress. Appearance: She is well-developed. She is not ill-appearing. HENT:      Head: Normocephalic. Eyes:      Extraocular Movements: Extraocular movements intact. Neck:      Musculoskeletal: Neck supple. Cardiovascular:      Rate and Rhythm: Normal rate. Pulmonary:      Effort: Pulmonary effort is normal.   Skin:     General: Skin is warm and dry. Neurological:      Mental Status: She is alert and oriented to person, place, and time. Psychiatric:         Behavior: Behavior normal.         ASSESSMENT and PLAN    ICD-10-CM ICD-9-CM    1. Pulsatile tinnitus of left ear  H93. A2 388.30 REFERRAL TO ENT-OTOLARYNGOLOGY   2. Neck mass  R22.1 784.2 REFERRAL TO ENT-OTOLARYNGOLOGY   Head and neck surgery referral generated            Mabel Joy MD      PLEASE NOTE:   This document has been produced using voice recognition software. Unrecognized errors in transcription may be present.

## 2021-01-26 NOTE — PROGRESS NOTES
Odelia Najjar is a 80 y.o. female (: 1935) presenting to address:    Chief Complaint   Patient presents with    Follow-up       Vitals:    21 0945   BP: 104/64   Pulse: (!) 55   Resp: 14   Temp: 97.2 °F (36.2 °C)   TempSrc: Temporal   SpO2: 99%   Weight: 151 lb 6.4 oz (68.7 kg)   Height: 5' 2.01\" (1.575 m)   PainSc:   0 - No pain       Hearing/Vision:   No exam data present    Learning Assessment:     Learning Assessment 2021   PRIMARY LEARNER Patient   HIGHEST LEVEL OF EDUCATION - PRIMARY LEARNER  DID NOT GRADUATE HIGH SCHOOL   BARRIERS PRIMARY LEARNER NONE   CO-LEARNER CAREGIVER No   PRIMARY LANGUAGE ENGLISH    NEED No   LEARNER PREFERENCE PRIMARY LISTENING   ANSWERED BY patient   RELATIONSHIP SELF     Depression Screening:     3 most recent PHQ Screens 2021   Little interest or pleasure in doing things Not at all   Feeling down, depressed, irritable, or hopeless Not at all   Total Score PHQ 2 0     Fall Risk Assessment:     Fall Risk Assessment, last 12 mths 2021   Able to walk? Yes   Fall in past 12 months? 0   Do you feel unsteady? 0   Are you worried about falling 1   Is the gait abnormal? 0   Number of falls in past 12 months -   Fall with injury? -     Abuse Screening:     Abuse Screening Questionnaire 2021   Do you ever feel afraid of your partner? N   Are you in a relationship with someone who physically or mentally threatens you? N   Is it safe for you to go home? Y     Coordination of Care Questionaire:   1. Have you been to the ER, urgent care clinic since your last visit? Hospitalized since your last visit? NO    2. Have you seen or consulted any other health care providers outside of the [address] [place] [address] since your last visit? Include any pap smears or colon screening. YES, MRI    Advanced Directive:   1. Do you have an Advanced Directive? YES    2. Would you like information on Advanced Directives?  NO

## 2021-03-02 DIAGNOSIS — H93.A2 PULSATILE TINNITUS OF LEFT EAR: ICD-10-CM

## 2021-03-02 DIAGNOSIS — R41.3 MEMORY LOSS OF UNKNOWN CAUSE: ICD-10-CM

## 2021-03-29 ENCOUNTER — OFFICE VISIT (OUTPATIENT)
Dept: FAMILY MEDICINE CLINIC | Age: 86
End: 2021-03-29
Payer: MEDICARE

## 2021-03-29 VITALS
RESPIRATION RATE: 15 BRPM | SYSTOLIC BLOOD PRESSURE: 134 MMHG | HEART RATE: 60 BPM | BODY MASS INDEX: 28.82 KG/M2 | OXYGEN SATURATION: 98 % | HEIGHT: 62 IN | WEIGHT: 156.6 LBS | TEMPERATURE: 97.5 F | DIASTOLIC BLOOD PRESSURE: 76 MMHG

## 2021-03-29 DIAGNOSIS — R74.8 ELEVATED VITAMIN B12 LEVEL: ICD-10-CM

## 2021-03-29 DIAGNOSIS — E03.9 ACQUIRED HYPOTHYROIDISM: ICD-10-CM

## 2021-03-29 DIAGNOSIS — J44.9 CHRONIC OBSTRUCTIVE PULMONARY DISEASE, UNSPECIFIED COPD TYPE (HCC): ICD-10-CM

## 2021-03-29 DIAGNOSIS — H93.A2 PULSATILE TINNITUS OF LEFT EAR: ICD-10-CM

## 2021-03-29 DIAGNOSIS — R41.3 MEMORY PROBLEM: ICD-10-CM

## 2021-03-29 DIAGNOSIS — I10 ESSENTIAL HYPERTENSION: ICD-10-CM

## 2021-03-29 DIAGNOSIS — F41.9 CHRONIC ANXIETY: ICD-10-CM

## 2021-03-29 DIAGNOSIS — R22.1 NECK MASS: Primary | ICD-10-CM

## 2021-03-29 PROCEDURE — 99213 OFFICE O/P EST LOW 20 MIN: CPT | Performed by: FAMILY MEDICINE

## 2021-03-29 NOTE — PROGRESS NOTES
Jazlyn Ear presents today for   Chief Complaint   Patient presents with    Follow-up       Is someone accompanying this pt? yes    Is the patient using any DME equipment during OV? yes    Depression Screening:  3 most recent PHQ Screens 3/29/2021   [facility] Not Done Patient Decline   Little interest or pleasure in doing things Not at all   Feeling down, depressed, irritable, or hopeless Not at all   Total Score PHQ 2 0       Learning Assessment:  Learning Assessment 1/7/2021   PRIMARY LEARNER Patient   HIGHEST LEVEL OF EDUCATION - PRIMARY LEARNER  DID NOT GRADUATE [facility] PRIMARY LEARNER NONE   CO-LEARNER CAREGIVER No   PRIMARY LANGUAGE ENGLISH    NEED No   LEARNER PREFERENCE PRIMARY LISTENING   ANSWERED BY patient   RELATIONSHIP SELF       Travel Screening:    Travel Screening     Question   Response    In the last month, have you been in contact with someone who was confirmed or suspected to have Robb Rumple / COVID-19? No / Unsure    Have you had a COVID-19 viral test in the last 14 days? No    Do you have any of the following new or worsening symptoms? None of these    Have you traveled internationally or domestically in the last month? No      Travel History   Travel since 02/28/21     No documented travel since 02/28/21          Health Maintenance reviewed and discussed and ordered per Provider. Health Maintenance Due   Topic Date Due    Shingrix Vaccine Age 49> (1 of 2) Never done    Bone Densitometry (Dexa) Screening  Never done    COVID-19 Vaccine (2 - Moderna 2-dose series) 02/19/2021   . Coordination of Care:  1. Have you been to the ER, urgent care clinic since your last visit? Hospitalized since your last visit? no    2. Have you seen or consulted any other health care providers outside of the [address] [place] [address] since your last visit? Include any pap smears or colon screening. Yes.

## 2021-03-29 NOTE — PATIENT INSTRUCTIONS
Family is interested in neurology evaluation of the patient's memory issues They agree to check with her insurance carrier and call back with on plan neurology provider so that a referral can be generated She will follow up with [facility] head and neck surgery in about a month to include a repeat MRI of her neck mass. Return for lab appointment followed by Medicare wellness appointment in January and sooner with any problems

## 2021-06-18 DIAGNOSIS — F41.9 CHRONIC ANXIETY: Primary | ICD-10-CM

## 2021-06-18 RX ORDER — LORAZEPAM 0.5 MG/1
TABLET ORAL
Qty: 90 TABLET | Refills: 1 | Status: SHIPPED | OUTPATIENT
Start: 2021-06-18 | End: 2021-12-13

## 2021-06-18 NOTE — TELEPHONE ENCOUNTER
Patient called in regards to needing a refill     Requested Prescriptions     Pending Prescriptions Disp Refills    LORazepam (ATIVAN) 0.5 mg tablet       Sig: Take 1 Tablet by mouth.

## 2021-06-18 NOTE — TELEPHONE ENCOUNTER
Last visit: 3/29/2021  Next visit:   Future Appointments   Date Time Provider Arya Nicholei   [facility]24[facility]20[facility]  [facility]   6/24/2021  8:30 AM [facility] MRI 1 CRMCMRI [facility]   6/24/2021  9:15 AM [facility] MRI 1 [facility]CMRI [facility]   1/24/2022  7:30 AM LAB_BSZENA SCHMID BS AMB   1/26/2022  1:00 PM Jacob Trinh MD [facility] BS AMB

## 2021-09-20 RX ORDER — FLUOXETINE HYDROCHLORIDE 20 MG/1
20 CAPSULE ORAL DAILY
Qty: 90 CAPSULE | Refills: 1 | Status: SHIPPED | OUTPATIENT
Start: 2021-09-20 | End: 2021-09-21 | Stop reason: SDUPTHER

## 2021-09-20 RX ORDER — HYDRALAZINE HYDROCHLORIDE 25 MG/1
25 TABLET, FILM COATED ORAL DAILY
Qty: 90 TABLET | Refills: 1 | Status: SHIPPED | OUTPATIENT
Start: 2021-09-20 | End: 2021-09-21 | Stop reason: SDUPTHER

## 2021-09-20 NOTE — TELEPHONE ENCOUNTER
Called patient she states she thinks she is taking her Prozac twice daily, I informed her  to check her bottle and call back with correct information.

## 2021-09-20 NOTE — TELEPHONE ENCOUNTER
Last seen 3/29/21    Last filled: never entered historical    Future Appointments   Date Time Provider Arya Nicholei   1/24/2022  7:30 AM LAB_SHARMAINE LEWIS   1/26/2022  1:00 PM MD SHARMAINE Collins     Called and left message patient to return call to clarify who was prescribing these medications in the past since I dont see that we have every prescribed them. Spoke to patient she states that her old PCP was the one who prescribed it in [place] and she finally ran out of the refills.

## 2021-09-21 RX ORDER — FLUOXETINE HYDROCHLORIDE 20 MG/1
20 CAPSULE ORAL 2 TIMES DAILY
Qty: 180 CAPSULE | Refills: 1 | Status: SHIPPED | OUTPATIENT
Start: 2021-09-21 | End: 2022-01-26 | Stop reason: ALTCHOICE

## 2021-09-21 RX ORDER — HYDRALAZINE HYDROCHLORIDE 25 MG/1
25 TABLET, FILM COATED ORAL 3 TIMES DAILY
Qty: 270 TABLET | Refills: 1 | Status: SHIPPED | OUTPATIENT
Start: 2021-09-21 | End: 2022-04-18

## 2021-09-21 NOTE — TELEPHONE ENCOUNTER
Patient called back and stated that after she reviewed her bottles the prozac is suppose to be for twice daily and the hydralazine is TID not daily. Will call pharmacy and cancel previous ordered rx since they were for the wrong directions.

## 2021-10-14 ENCOUNTER — TELEPHONE (OUTPATIENT)
Dept: FAMILY MEDICINE CLINIC | Age: 86
End: 2021-10-14

## 2021-10-14 NOTE — TELEPHONE ENCOUNTER
Received a call from a Ms Debora Silva a NP with [facility] who is at patients home doing a Home assessment . She is wanting to know if Dr Mcdonald Peabody can Order PT for patient since she has some instability when walking . Patient also c/o of loss of feeling in feet and that   her depression was assessed to be moderate with a score of 14 . She is currently on fluoxetine and was wondering if patient could be changed to sertraline to help both her anxiety and depression . I notified . I told Ms Debora Silva that patient hasn't been seen since march and would need a appointment to discuss the above . She voiced understanding and patient has been scheduled for OV .

## 2021-10-25 ENCOUNTER — OFFICE VISIT (OUTPATIENT)
Dept: FAMILY MEDICINE CLINIC | Age: 86
End: 2021-10-25
Payer: MEDICARE

## 2021-10-25 VITALS
SYSTOLIC BLOOD PRESSURE: 130 MMHG | HEART RATE: 68 BPM | DIASTOLIC BLOOD PRESSURE: 68 MMHG | RESPIRATION RATE: 16 BRPM | HEIGHT: 62 IN | TEMPERATURE: 97.6 F | BODY MASS INDEX: 26.68 KG/M2 | OXYGEN SATURATION: 98 % | WEIGHT: 145 LBS

## 2021-10-25 DIAGNOSIS — F41.9 ANXIETY AND DEPRESSION: Primary | ICD-10-CM

## 2021-10-25 DIAGNOSIS — Z23 NEEDS FLU SHOT: ICD-10-CM

## 2021-10-25 DIAGNOSIS — Z78.0 ASYMPTOMATIC POSTMENOPAUSAL ESTROGEN DEFICIENCY: ICD-10-CM

## 2021-10-25 DIAGNOSIS — Z12.31 BREAST CANCER SCREENING BY MAMMOGRAM: ICD-10-CM

## 2021-10-25 DIAGNOSIS — F32.A ANXIETY AND DEPRESSION: Primary | ICD-10-CM

## 2021-10-25 DIAGNOSIS — R26.89 BALANCE PROBLEMS: ICD-10-CM

## 2021-10-25 PROCEDURE — G0008 ADMIN INFLUENZA VIRUS VAC: HCPCS | Performed by: FAMILY MEDICINE

## 2021-10-25 PROCEDURE — G8419 CALC BMI OUT NRM PARAM NOF/U: HCPCS | Performed by: FAMILY MEDICINE

## 2021-10-25 PROCEDURE — G8431 POS CLIN DEPRES SCRN F/U DOC: HCPCS | Performed by: FAMILY MEDICINE

## 2021-10-25 PROCEDURE — 1101F PT FALLS ASSESS-DOCD LE1/YR: CPT | Performed by: FAMILY MEDICINE

## 2021-10-25 PROCEDURE — G8536 NO DOC ELDER MAL SCRN: HCPCS | Performed by: FAMILY MEDICINE

## 2021-10-25 PROCEDURE — 99214 OFFICE O/P EST MOD 30 MIN: CPT | Performed by: FAMILY MEDICINE

## 2021-10-25 PROCEDURE — 1090F PRES/ABSN URINE INCON ASSESS: CPT | Performed by: FAMILY MEDICINE

## 2021-10-25 PROCEDURE — G8427 DOCREV CUR MEDS BY ELIG CLIN: HCPCS | Performed by: FAMILY MEDICINE

## 2021-10-25 PROCEDURE — 90694 VACC AIIV4 NO PRSRV 0.5ML IM: CPT | Performed by: FAMILY MEDICINE

## 2021-10-25 RX ORDER — DICLOFENAC SODIUM 10 MG/G
GEL TOPICAL 4 TIMES DAILY
COMMUNITY

## 2021-10-25 RX ORDER — DONEPEZIL HYDROCHLORIDE 5 MG/1
TABLET, FILM COATED ORAL
COMMUNITY
End: 2022-08-30 | Stop reason: DRUGHIGH

## 2021-10-25 NOTE — PROGRESS NOTES
Carol Baker is a 80 y.o. female (: 1935) presenting to address:    Chief Complaint   Patient presents with    Breast Problem     needs order also suggested to get a bone density .  Immunization/Injection     wants flu shot     Depression     discuss changing medication . Vitals:    10/25/21 1344   BP: 130/68   Pulse: 68   Resp: 16   Temp: 97.6 °F (36.4 °C)   TempSrc: Temporal   SpO2: 98%   Weight: 145 lb (65.8 kg)   Height: 5' 2\" (1.575 m)   PainSc:   0 - No pain       Hearing/Vision:   No exam data present    Learning Assessment:     Learning Assessment 2021   PRIMARY LEARNER Patient   HIGHEST LEVEL OF EDUCATION - PRIMARY LEARNER  DID NOT GRADUATE HIGH SCHOOL   BARRIERS PRIMARY LEARNER NONE   CO-LEARNER CAREGIVER No   PRIMARY LANGUAGE ENGLISH    NEED No   LEARNER PREFERENCE PRIMARY LISTENING   ANSWERED BY patient   RELATIONSHIP SELF     Depression Screening:     3 most recent [facility] Screens 3/29/2021   [facility] Not Done Patient Decline   Little interest or pleasure in doing things Not at all   Feeling down, depressed, irritable, or hopeless Not at all   Total Score PHQ 2 0     Fall Risk Assessment:     Fall Risk Assessment, last 12 mths 3/29/2021   Able to walk? Yes   Fall in past 12 months? 0   Do you feel unsteady? 1   Are you worried about falling 0   Is the gait abnormal? 0   Number of falls in past 12 months -   Fall with injury? -     Abuse Screening:     Abuse Screening Questionnaire 2021   Do you ever feel afraid of your partner? N   Are you in a relationship with someone who physically or mentally threatens you? N   Is it safe for you to go home? Y     Coordination of Care Questionaire:   1. Have you been to the ER, urgent care clinic since your last visit? Hospitalized since your last visit? NO    2. Have you seen or consulted any other health care providers outside of the [address] [place] [address] since your last visit? Include any pap smears or colon screening. NO    Advanced Directive:   1. Do you have an Advanced Directive? Yes     2. Would you like information on Advanced Directives? NO    Flu shot Immunization/s administered 10/25/2021 by Benson Ortiz LPN with guardian's consent. Patient tolerated procedure well. No reactions noted.

## 2021-10-25 NOTE — PATIENT INSTRUCTIONS
Physical therapy referral generated  Orders entered for mammogram and bone density scan  Continue current medications pending neurology evaluation  Return as scheduled for lab appointment followed by Medicare wellness evaluation in January or sooner with any problems

## 2021-10-25 NOTE — PROGRESS NOTES
HISTORY OF PRESENT ILLNESS  Isaac Barragan is a 80 y.o. female. Ms. Vikram Schroeder presents for follow-up with a history of chronic anxiety/depression treated with fluoxetine 20 mg twice daily and hydroxyzine 25 mg 3 times daily. She has recently been started on benazepril 5 mg daily by neurology consultant and has a follow-up this week to see about possibly adjusting the dosage. She and her family agree that she remains depressed to some extent because of her problems with memory and advancing age but also related to the loss of her daughter from breast cancer over a year ago. She has been feeling a little unsteady. Mr#: [id number]      Past Medical History:   Diagnosis Date    COPD (chronic obstructive pulmonary disease) (HCC)     Depression     Hypertension     Hypertension     Shortness of breath     Thyroid disease     Vertigo        Past Surgical History:   Procedure Laterality Date    HX BREAST BIOPSY      HX CATARACT REMOVAL      HX HYSTERECTOMY      HX KNEE REPLACEMENT Left 2016       Family History   Problem Relation Age of Onset    Hypertension Mother     Hypertension Father     Breast Cancer Child        Allergies   Allergen Reactions    Pcn [Penicillins] Rash       Social History     Tobacco Use   Smoking Status Never Smoker   Smokeless Tobacco Never Used       Social History     Substance and Sexual Activity   Alcohol Use Yes            Patient Active Problem List   Diagnosis Code    Essential hypertension I10    Acquired hypothyroidism E03.9         Current Outpatient Medications:     donepeziL (Aricept) 5 mg tablet, Take  by mouth nightly., Disp: , Rfl:     tetrahydrozoline HCl/zinc sulf (EYE DROPS ALLERGY RELIEF OP), Apply 1 Drop to eye two (2) times daily as needed. , Disp: , Rfl:     diclofenac (Voltaren) 1 % gel, Apply  to affected area four (4) times daily. , Disp: , Rfl:     FLUoxetine (PROzac) 20 mg capsule, Take 1 Capsule by mouth two (2) times a day., Disp: 180 Capsule, Rfl: 1    hydrALAZINE (APRESOLINE) 25 mg tablet, Take 1 Tablet by mouth three (3) times daily. , Disp: 270 Tablet, Rfl: 1    LORazepam (ATIVAN) 0.5 mg tablet, Take 0.5 mg by mouth daily. , Disp: 90 Tablet, Rfl: 1    aspirin 81 mg chewable tablet, Take 81 mg by mouth., Disp: , Rfl:     losartan (COZAAR) 100 mg tablet, Take 100 mg by mouth., Disp: , Rfl:     levothyroxine (SYNTHROID) 75 mcg tablet, Take 75 mcg by mouth., Disp: , Rfl:     furosemide (LASIX) 20 mg tablet, Take 20 mg by mouth daily as needed. , Disp: , Rfl:     loratadine (CLARITIN) 10 mg tablet, Take 10 mg by mouth., Disp: , Rfl:     dilTIAZem CD (CARTIA XT) 240 mg ER capsule, Take 240 mg by mouth., Disp: , Rfl:         Review of Systems   Constitutional: Positive for weight loss ( Patient and family seem unaware, report that the patient seems to have a persistent and reasonable appetite. She eats cereal or oatmeal for breakfast, has ice cream for lunch and then a normal dinner. ). Negative for fever. Respiratory: Negative for shortness of breath. Cardiovascular: Negative for chest pain, palpitations and leg swelling. Gastrointestinal: Negative for abdominal pain. Neurological:        Not dizzy but sometimes feels unsteady   Psychiatric/Behavioral: Positive for depression and memory loss. Negative for suicidal ideas. The patient is nervous/anxious. Visit Vitals  /68   Pulse 68   Temp 97.6 °F (36.4 °C) (Temporal)   Resp 16   Ht 5' 2\" (1.575 m)   Wt 145 lb (65.8 kg)   SpO2 98%   BMI 26.52 kg/m²       Physical Exam  Vitals and nursing note reviewed. Constitutional:       General: She is not in acute distress. Appearance: Normal appearance. She is well-developed. She is not ill-appearing. Comments: Weight is down about 6 pounds compared with 10 months ago   HENT:      Head: Normocephalic. Eyes:      Extraocular Movements: Extraocular movements intact.       Conjunctiva/sclera: Conjunctivae normal.   Cardiovascular: Rate and Rhythm: Normal rate and regular rhythm. Heart sounds: Normal heart sounds. Pulmonary:      Effort: Pulmonary effort is normal.      Breath sounds: Normal breath sounds. Musculoskeletal:      Cervical back: Neck supple. Skin:     General: Skin is warm and dry. Neurological:      Mental Status: She is alert. Psychiatric:         Mood and Affect: Affect is flat. Behavior: Behavior normal.         ASSESSMENT and PLAN    ICD-10-CM ICD-9-CM    1. Anxiety and depression  F41.9 300.00     F32. A 311    2. Balance problems  R26.89 781.99 REFERRAL TO PHYSICAL THERAPY   3. Breast cancer screening by mammogram  Z12.31 V76.12 [facility] MAMMO BI SCREENING INCL CAD   4. Asymptomatic postmenopausal estrogen deficiency  Z78.0 V49.81 DEXA BONE DENSITY STUDY AXIAL   5. Needs flu shot  Z23 V04.81 FLU (FLUAD QUAD INFLUENZA VACCINE,QUAD,ADJUVANTED)   Assessment:  Anxiety and depression patient and family hoping for a medication change for better control of symptoms  Dementia to starting on treatment followed by neurology  Balance difficulty to be addressed    Health maintenance recommendations:  Influenza immunization given today    Plan:  Physical therapy referral generated  Orders entered for mammogram and bone density scan  Continue current medications pending neurology evaluation, recommend discussing depressive symptoms with the neurology consultant as well  Return as scheduled for lab appointment followed by Medicare wellness evaluation in January or sooner with any problems    Mattie Mares MD      PLEASE NOTE:   This document has been produced using voice recognition software. Unrecognized errors in transcription may be present.

## 2021-11-03 RX ORDER — LOSARTAN POTASSIUM 100 MG/1
100 TABLET ORAL DAILY
Qty: 90 TABLET | Refills: 1 | Status: SHIPPED | OUTPATIENT
Start: 2021-11-03 | End: 2022-04-18

## 2021-11-03 RX ORDER — FUROSEMIDE 20 MG/1
20 TABLET ORAL DAILY
Qty: 90 TABLET | Refills: 1 | Status: SHIPPED | OUTPATIENT
Start: 2021-11-03 | End: 2022-04-18

## 2021-11-03 RX ORDER — DILTIAZEM HYDROCHLORIDE 240 MG/1
240 CAPSULE, COATED, EXTENDED RELEASE ORAL DAILY
Qty: 90 CAPSULE | Refills: 1 | Status: SHIPPED | OUTPATIENT
Start: 2021-11-03 | End: 2022-04-18

## 2021-11-03 NOTE — TELEPHONE ENCOUNTER
Lasix has refill date of 5/24/12 by another provider . Losartan and diltiazem  no past refills on file .   Last OV 10/25/21   Future Appointments   Date Time Provider Arya Miller   1/24/2022  7:30 AM LAB_SHARMAINE SCHMID BS AMB   1/26/2022  1:00 PM MD SHARMAINE Gonzáles BS AMB

## 2021-11-03 NOTE — TELEPHONE ENCOUNTER
Elderly patient with dementia, please check with her family to make sure that she is actually taking these medications since they have not been prescribed here and it does not appear that they have been prescribed elsewhere very recently.

## 2021-11-03 NOTE — TELEPHONE ENCOUNTER
Spoke to daughter Sina Carrion she states she was prescribed them from her previous PCP in [place] and finally just ran out of her prescriptions from them. Patient is currently taking these medications.

## 2021-11-10 ENCOUNTER — TELEPHONE (OUTPATIENT)
Dept: FAMILY MEDICINE CLINIC | Age: 86
End: 2021-11-10

## 2021-11-10 NOTE — TELEPHONE ENCOUNTER
Pt is requesting a referral  [facility]  Please advise

## 2021-12-01 NOTE — PROGRESS NOTES
HISTORY OF PRESENT ILLNESS  Jennifer Escobdeo is a 80 y.o. female. She reports seeing black stool off and on for the past 5 days. She is aware that this can be a sign of gastrointestinal bleeding and is therefore concerned. She denies any systemic signs of illness particularly palpitations, dizziness or lightheadedness. She denies any change in bowel habits and is chronically constipated. She has not experienced nausea or vomiting. He has no history of gastrointestinal bleeding. She is not treated with nonsteroidal anti-inflammatory medications except for Voltaren gel. Mr#: [id number]      Past Medical History:   Diagnosis Date    COPD (chronic obstructive pulmonary disease) (HCC)     Depression     Hypertension     Hypertension     Shortness of breath     Thyroid disease     Vertigo        Past Surgical History:   Procedure Laterality Date    HX BREAST BIOPSY      HX CATARACT REMOVAL      HX HYSTERECTOMY      HX KNEE REPLACEMENT Left 2016       Family History   Problem Relation Age of Onset    Hypertension Mother     Hypertension Father     Breast Cancer Child        Allergies   Allergen Reactions    Pcn [Penicillins] Rash       Social History     Tobacco Use   Smoking Status Never Smoker   Smokeless Tobacco Never Used       Social History     Substance and Sexual Activity   Alcohol Use Yes            Patient Active Problem List   Diagnosis Code    Essential hypertension I10    Acquired hypothyroidism E03.9         Current Outpatient Medications:     furosemide (LASIX) 20 mg tablet, Take 1 Tablet by mouth daily. , Disp: 90 Tablet, Rfl: 1    dilTIAZem ER (Cartia XT) 240 mg capsule, Take 1 Capsule by mouth daily. , Disp: 90 Capsule, Rfl: 1    losartan (COZAAR) 100 mg tablet, Take 1 Tablet by mouth daily. , Disp: 90 Tablet, Rfl: 1    donepeziL (Aricept) 5 mg tablet, Take  by mouth nightly., Disp: , Rfl:     tetrahydrozoline HCl/zinc sulf (EYE DROPS ALLERGY RELIEF OP), Apply 1 Drop to eye two PT Therapy Daily Note  ADDENDUM D/C SUMMARY  INSURANCE INFORMATION                                        Visit number: 5    Insurance: Payor: DALE/MAXIM / Plan: [id number] / Product Type: POS MISC   Per Keisha at [facility]:  AUTHORIZATION:   After 8th visit call for auth through case management specialist [phone]  VISITS:  no visit limit  DEDUCTIBLE:   200/200  OOP max is 1500/271.02  COINS:   90%  COPAY:  0  EFFECTIVE DATE 11/01/2011  REFERENCE:  [id number]        Referred by: Pritesh Rascon MD  Next MD Visit:                                     Attendance policy has been discussed with patient at time of initial evaluation.     Treatment Diagnosis: Lumbar radiculopathy [M54.16]  - Primary      SUBJECTIVE      Date of onset/injury: gradual onset 8 weeks ago     History of Present Condition: woke up on a Saturday am and had pain down left leg.  Weakness of ankle and generally leg.  Went to see Dr Angeles and had a Dospak with less leg weakness.  Pain remains in left leg buttock and leg.       PMH:  Hx prior low back strain.         Previous Treatment/ Relevant Diagnostic Tests: MRI L4/5 lateral protrusion.         Current Medications (patient-reported): refer to PTA Medication section of electronic health record     Pain Report   Current Symptoms on Evaluation: points directly to left buttock.  Will shoot down left LE in L4 distribution.   Current: 3/10            Worst: 3/10                 Best: 0/10       Description:      FUNCTIONAL STATUS      Prior Level of Function: Patient reports prior level of function as independent with all ADLs and instrumental activites of daily living.    Lee     Functional Limitatons: crossing leg to put on socks is better.  Sitting increases pain after 30'.  Transitions after sitting long distance.       Patient Goals/Concerns: reduce pain      SUBJECTIVE     12/22/17: nearly 80% improved.     12/18/17:  glute pain nearly gone.  Reports return of sensation  (2) times daily as needed. , Disp: , Rfl:     diclofenac (Voltaren) 1 % gel, Apply  to affected area four (4) times daily. , Disp: , Rfl:     FLUoxetine (PROzac) 20 mg capsule, Take 1 Capsule by mouth two (2) times a day. (Patient taking differently: Take 20 mg by mouth daily. Weaning off and switching to Paxil but hasn't started it yet.), Disp: 180 Capsule, Rfl: 1    hydrALAZINE (APRESOLINE) 25 mg tablet, Take 1 Tablet by mouth three (3) times daily. , Disp: 270 Tablet, Rfl: 1    LORazepam (ATIVAN) 0.5 mg tablet, Take 0.5 mg by mouth daily. (Patient taking differently: Take 1 mg by mouth daily.), Disp: 90 Tablet, Rfl: 1    aspirin 81 mg chewable tablet, Take 81 mg by mouth., Disp: , Rfl:     levothyroxine (SYNTHROID) 75 mcg tablet, Take 75 mcg by mouth., Disp: , Rfl:     loratadine (CLARITIN) 10 mg tablet, Take 10 mg by mouth., Disp: , Rfl:         Review of Systems   Constitutional: Negative for fever, malaise/fatigue and weight loss. Cardiovascular: Negative for chest pain and palpitations. Gastrointestinal: Positive for blood in stool ( Off and on for 5 days). Negative for abdominal pain, constipation, diarrhea, heartburn, melena, nausea and vomiting. Musculoskeletal: Positive for joint pain ( Chronic left knee pain with current treatment in physical therapy). Neurological: Negative for dizziness. Psychiatric/Behavioral: Positive for memory loss. Visit Vitals  /68   Pulse (!) 57   Temp 97.9 °F (36.6 °C) (Temporal)   Resp 16   Ht 5' 2\" (1.575 m)   Wt 147 lb (66.7 kg)   SpO2 98%   BMI 26.89 kg/m²       Physical Exam  Vitals and nursing note reviewed. Constitutional:       Appearance: She is well-developed. HENT:      Head: Normocephalic. Eyes:      Extraocular Movements: Extraocular movements intact. Conjunctiva/sclera: Conjunctivae normal.   Cardiovascular:      Rate and Rhythm: Normal rate and regular rhythm. Heart sounds: Normal heart sounds.    Pulmonary:      Effort: to left large toe.      OBJECTIVE     MMT:    (-) slump    TODAY'S TREATMENT     • Issued and instructed in home exercise program: details listed below.    Manual therapy   12/12/17 12/14/17 12/18/17 12/22/17   IASTM Right Piriformis   Right glute med As prior Left Piriformis   Left glute med Left Piriformis   Left glute med                          Therex Log   12/12/17 12/14/17 12/18/17 12/22/17   Electrostim:  Frequency 2pps to create muscle twitch.  Constant attendance required to adjust intensity every 3-4 minutes due to muscle fatigue, and also to monitor pt tolerance to aggressive stim.     15' 15 15' 15'   MAYDA Hep  10x 10x 10x   PPU Hep  10x 10x 10x   ADIM  #1,3,4,5 rev'd     Prone plank  *(NEW HEP)*      PFS  3x     Neural gliding    *(NEW HEP)*           traction   15' 15'     Hook lying Intermittent mechanical traction performed for a total of  15 minutes with on time 60 seconds off time 10 seconds;  Max pull 70# and min pull 10 pounds.       ASSESSMENT     (-) slump so we started hamstring stretching.    Ok with current poc to hold PT.      Results of Education: Verbalizes understanding    PLAN FOR NEXT SESSION     F/u in 2 weeks.  If doing well is ok to cancel and we will d/c PT    GOALS     1.  Pt will demonstrate proper neutral core activation and min control with LE movements to allow a functional progression of treatment.    MET   2.  Abolish dureal slump to allow progression of neural gliding and core work.  MET      Long Term goals to be met at discharge  1. Pt will be independent and compliant with a comprehensive HEP to allow future injury prevention and self progression of treatment once discharged.  MET   2. Pt will demonstrate proper body mechanics to prevent future injury. MET   3.  LE MMT 5/5 to allwo return to recreational pursuits.  MET     BILLING     Insurance: Payor: DALE/MAXIM / Plan: [id number] / Product Type: POS MISC    Timed Procedures  1 Unit:  Attended E-stim:  15  Pulmonary effort is normal.      Breath sounds: Normal breath sounds. Abdominal:      General: Abdomen is flat. Bowel sounds are normal. There is no distension. Palpations: Abdomen is soft. There is mass. Tenderness: There is no abdominal tenderness. Genitourinary:     Rectum: Normal. Guaiac result negative. Comments: Digital rectal exam with palpable formed stool, dark brown sample Hemoccult negative  Musculoskeletal:      Cervical back: Neck supple. Skin:     General: Skin is warm and dry. Neurological:      Mental Status: She is alert and oriented to person, place, and time. Psychiatric:         Mood and Affect: Mood normal.         Behavior: Behavior normal.         ASSESSMENT and PLAN    ICD-10-CM ICD-9-CM    1. Dark stools  R19.5 792.1    2. Constipation, unspecified constipation type  K59.00 564.00 polyethylene glycol (MIRALAX) 17 gram/dose powder   Assessment:  Dark stool reported, Hemoccult negative  Constipation to be addressed    Health maintenance recommendations:  COVID-19 immunization booster    Plan:  Continue prunes and other dietary fiber  Begin MiraLAX, 1 capful in any 8 ounce beverage daily-if constipation continues increase to twice daily  Follow-up for new symptoms, worsening symptoms or failure to improve    Dong Argueta MD      PLEASE NOTE:   This document has been produced using voice recognition software. Unrecognized errors in transcription may be present. minutes  1 Unit:  Manual therapy:  15 minutes    Untimed Procedures  1 Unit:   Mechanical traction      Total Treatment Time: 45 minutes

## 2021-12-02 ENCOUNTER — OFFICE VISIT (OUTPATIENT)
Dept: FAMILY MEDICINE CLINIC | Age: 86
End: 2021-12-02
Payer: MEDICARE

## 2021-12-02 VITALS
SYSTOLIC BLOOD PRESSURE: 120 MMHG | RESPIRATION RATE: 16 BRPM | DIASTOLIC BLOOD PRESSURE: 68 MMHG | BODY MASS INDEX: 27.05 KG/M2 | OXYGEN SATURATION: 98 % | HEIGHT: 62 IN | WEIGHT: 147 LBS | TEMPERATURE: 97.9 F | HEART RATE: 57 BPM

## 2021-12-02 DIAGNOSIS — R19.5 DARK STOOLS: Primary | ICD-10-CM

## 2021-12-02 DIAGNOSIS — K59.00 CONSTIPATION, UNSPECIFIED CONSTIPATION TYPE: ICD-10-CM

## 2021-12-02 PROCEDURE — 99213 OFFICE O/P EST LOW 20 MIN: CPT | Performed by: FAMILY MEDICINE

## 2021-12-02 PROCEDURE — 1090F PRES/ABSN URINE INCON ASSESS: CPT | Performed by: FAMILY MEDICINE

## 2021-12-02 PROCEDURE — G8419 CALC BMI OUT NRM PARAM NOF/U: HCPCS | Performed by: FAMILY MEDICINE

## 2021-12-02 PROCEDURE — G8427 DOCREV CUR MEDS BY ELIG CLIN: HCPCS | Performed by: FAMILY MEDICINE

## 2021-12-02 PROCEDURE — 1101F PT FALLS ASSESS-DOCD LE1/YR: CPT | Performed by: FAMILY MEDICINE

## 2021-12-02 PROCEDURE — G8536 NO DOC ELDER MAL SCRN: HCPCS | Performed by: FAMILY MEDICINE

## 2021-12-02 PROCEDURE — G8432 DEP SCR NOT DOC, RNG: HCPCS | Performed by: FAMILY MEDICINE

## 2021-12-02 RX ORDER — POLYETHYLENE GLYCOL 3350 17 G/17G
POWDER, FOR SOLUTION ORAL
Qty: 289 G | Refills: 5 | Status: SHIPPED | OUTPATIENT
Start: 2021-12-02

## 2021-12-02 NOTE — PROGRESS NOTES
Gaviota Jamil is a 80 y.o. female (: 1935) presenting to address:    Chief Complaint   Patient presents with    Melena     on and off for 5 days     Knee Pain     left doing PT       Vitals:    21 1127   BP: 120/68   Pulse: (!) 57   Resp: 16   Temp: 97.9 °F (36.6 °C)   TempSrc: Temporal   SpO2: 98%   Weight: 147 lb (66.7 kg)   Height: 5' 2\" (1.575 m)   PainSc:   0 - No pain       Hearing/Vision:   No exam data present    Learning Assessment:     Learning Assessment 2021   PRIMARY LEARNER Patient   HIGHEST LEVEL OF EDUCATION - PRIMARY LEARNER  DID NOT GRADUATE HIGH SCHOOL   BARRIERS PRIMARY LEARNER NONE   CO-LEARNER CAREGIVER No   PRIMARY LANGUAGE ENGLISH    NEED No   LEARNER PREFERENCE PRIMARY LISTENING   ANSWERED BY patient   RELATIONSHIP SELF     Depression Screening:     3 most recent PHQ Screens 10/25/2021   PHQ Not Done -   Little interest or pleasure in doing things More than half the days   Feeling down, depressed, irritable, or hopeless Nearly every day   Total Score PHQ 2 5   Trouble falling or staying asleep, or sleeping too much Several days   Feeling tired or having little energy Nearly every day   Poor appetite, weight loss, or overeating Several days   Feeling bad about yourself - or that you are a failure or have let yourself or your family down Not at all   Trouble concentrating on things such as school, work, reading, or watching TV Several days   Moving or speaking so slowly that other people could have noticed; or the opposite being so fidgety that others notice More than half the days   Thoughts of being better off dead, or hurting yourself in some way Not at all   PHQ 9 Score 13   How difficult have these problems made it for you to do your work, take care of your home and get along with others Somewhat difficult     Fall Risk Assessment:     Fall Risk Assessment, last 12 mths 3/29/2021   Able to walk? Yes   Fall in past 12 months? 0   Do you feel unsteady?  1 Are you worried about falling 0   Is the gait abnormal? 0   Number of falls in past 12 months -   Fall with injury? -     Abuse Screening:     Abuse Screening Questionnaire 1/7/2021   Do you ever feel afraid of your partner? N   Are you in a relationship with someone who physically or mentally threatens you? N   Is it safe for you to go home? Y     ADL Assessment:     ADL Assessment 1/7/2021   Feeding yourself No Help Needed   Getting from bed to chair No Help Needed   Getting dressed No Help Needed   Bathing or showering Help Needed   Walk across the room (includes cane/walker) No Help Needed   Using the telphone Help Needed   Taking your medications No Help Needed   Preparing meals No Help Needed   Managing money (expenses/bills) No Help Needed   Moderately strenuous housework (laundry) Help Needed   Shopping for personal items (toiletries/medicines) Help Needed   Shopping for groceries Help Needed   Driving Help Needed   Climbing a flight of stairs Help Needed   Getting to places beyond walking distances Help Needed        Coordination of Care Questionaire:   1. \"Have you been to the ER, urgent care clinic since your last visit? Hospitalized since your last visit? \" No    2. \"Have you seen or consulted any other health care providers outside of the [address] [place] [address] [place] since your last visit? \" yes     3. For patients aged 39-70: Has the patient had a colonoscopy? NA based on age or sex     If the patient is female:    3. For patients aged 41-77: Has the patient had a mammogram within the past 2 years? NA based on age or sex    11. For patients aged 21-65: Has the patient had a pap smear? NA based on age or sex    Advanced Directive:   1. Do you have an Advanced Directive? YES    2. Would you like information on Advanced Directives?  NO

## 2021-12-02 NOTE — PATIENT INSTRUCTIONS
Continue prunes and other dietary fiber  Begin MiraLAX, 1 capful in any 8 ounce beverage daily-if constipation continues increase to twice daily

## 2021-12-13 DIAGNOSIS — F41.9 CHRONIC ANXIETY: ICD-10-CM

## 2021-12-13 RX ORDER — LORAZEPAM 0.5 MG/1
TABLET ORAL
Qty: 90 TABLET | Refills: 1 | Status: SHIPPED | OUTPATIENT
Start: 2021-12-13 | End: 2022-08-30

## 2022-01-11 ENCOUNTER — TELEPHONE (OUTPATIENT)
Dept: FAMILY MEDICINE CLINIC | Age: 87
End: 2022-01-11

## 2022-01-11 DIAGNOSIS — R92.8 ABNORMAL MAMMOGRAM OF BOTH BREASTS: Primary | ICD-10-CM

## 2022-01-11 NOTE — TELEPHONE ENCOUNTER
Please advise the patient/her family that her mammogram was read as showing a need for additional views which have been ordered.   They should expect to hear from the [facility] about scheduling

## 2022-01-14 ENCOUNTER — TELEPHONE (OUTPATIENT)
Dept: FAMILY MEDICINE CLINIC | Age: 87
End: 2022-01-14

## 2022-01-14 NOTE — TELEPHONE ENCOUNTER
Please advise Ms. Moon/her family that her bone density scan did not show severe thinning of the bones called osteoporosis and she does not need treatment with prescription medication.

## 2022-01-24 ENCOUNTER — HOSPITAL ENCOUNTER (OUTPATIENT)
Dept: LAB | Age: 87
Discharge: HOME | End: 2022-01-24
Payer: MEDICARE

## 2022-01-24 ENCOUNTER — APPOINTMENT (OUTPATIENT)
Dept: FAMILY MEDICINE CLINIC | Age: 87
End: 2022-01-24

## 2022-01-24 DIAGNOSIS — I10 ESSENTIAL HYPERTENSION: ICD-10-CM

## 2022-01-24 DIAGNOSIS — H93.A2 PULSATILE TINNITUS OF LEFT EAR: ICD-10-CM

## 2022-01-24 DIAGNOSIS — R74.8 ELEVATED VITAMIN B12 LEVEL: ICD-10-CM

## 2022-01-24 DIAGNOSIS — E03.9 ACQUIRED HYPOTHYROIDISM: ICD-10-CM

## 2022-01-24 DIAGNOSIS — R22.1 NECK MASS: ICD-10-CM

## 2022-01-24 LAB
ALBUMIN SERPL-MCNC: 3.7 G/DL (ref 3.4–5)
ALBUMIN/GLOB SERPL: 1.2 {RATIO} (ref 0.8–1.7)
ALP SERPL-CCNC: 92 U/L (ref 45–117)
ALT SERPL-CCNC: 13 U/L (ref 13–56)
ANION GAP SERPL CALC-SCNC: 3 MMOL/L (ref 3–18)
AST SERPL-CCNC: 8 U/L (ref 10–38)
BASOPHILS # BLD: 0 K/UL (ref 0–0.1)
BASOPHILS NFR BLD: 0 % (ref 0–2)
BILIRUB SERPL-MCNC: 0.3 MG/DL (ref 0.2–1)
BUN SERPL-MCNC: 17 MG/DL (ref 7–18)
BUN/CREAT SERPL: 17 (ref 12–20)
CALCIUM SERPL-MCNC: 9.3 MG/DL (ref 8.5–10.1)
CHLORIDE SERPL-SCNC: 109 MMOL/L (ref 100–111)
CHOLEST SERPL-MCNC: 209 MG/DL
CO2 SERPL-SCNC: 31 MMOL/L (ref 21–32)
CREAT SERPL-MCNC: 1 MG/DL (ref 0.6–1.3)
DIFFERENTIAL METHOD BLD: ABNORMAL
EOSINOPHIL # BLD: 0.2 K/UL (ref 0–0.4)
EOSINOPHIL NFR BLD: 3 % (ref 0–5)
ERYTHROCYTE [DISTWIDTH] IN BLOOD BY AUTOMATED COUNT: 14.3 % (ref 11.6–14.5)
GLOBULIN SER CALC-MCNC: 3.1 G/DL (ref 2–4)
GLUCOSE SERPL-MCNC: 85 MG/DL (ref 74–99)
HCT VFR BLD AUTO: 37.4 % (ref 35–45)
HDLC SERPL-MCNC: 83 MG/DL (ref 40–60)
HDLC SERPL: 2.5 {RATIO} (ref 0–5)
HGB BLD-MCNC: 11.5 G/DL (ref 12–16)
IMM GRANULOCYTES # BLD AUTO: 0 K/UL (ref 0–0.04)
IMM GRANULOCYTES NFR BLD AUTO: 0 % (ref 0–0.5)
LDLC SERPL CALC-MCNC: 112.6 MG/DL (ref 0–100)
LIPID PROFILE,FLP: ABNORMAL
LYMPHOCYTES # BLD: 1.9 K/UL (ref 0.9–3.6)
LYMPHOCYTES NFR BLD: 31 % (ref 21–52)
MCH RBC QN AUTO: 29.6 PG (ref 24–34)
MCHC RBC AUTO-ENTMCNC: 30.7 G/DL (ref 31–37)
MCV RBC AUTO: 96.4 FL (ref 78–100)
MONOCYTES # BLD: 0.5 K/UL (ref 0.05–1.2)
MONOCYTES NFR BLD: 8 % (ref 3–10)
NEUTS SEG # BLD: 3.5 K/UL (ref 1.8–8)
NEUTS SEG NFR BLD: 57 % (ref 40–73)
NRBC # BLD: 0 K/UL (ref 0–0.01)
NRBC BLD-RTO: 0 PER 100 WBC
PLATELET # BLD AUTO: 245 K/UL (ref 135–420)
PMV BLD AUTO: 10.8 FL (ref 9.2–11.8)
POTASSIUM SERPL-SCNC: 3.9 MMOL/L (ref 3.5–5.5)
PROT SERPL-MCNC: 6.8 G/DL (ref 6.4–8.2)
RBC # BLD AUTO: 3.88 M/UL (ref 4.2–5.3)
SODIUM SERPL-SCNC: 143 MMOL/L (ref 136–145)
T4 FREE SERPL-MCNC: 0.8 NG/DL (ref 0.7–1.5)
TRIGL SERPL-MCNC: 67 MG/DL (ref ?–150)
TSH SERPL DL<=0.05 MIU/L-ACNC: 3.75 UIU/ML (ref 0.36–3.74)
VIT B12 SERPL-MCNC: 1071 PG/ML (ref 211–911)
VLDLC SERPL CALC-MCNC: 13.4 MG/DL
WBC # BLD AUTO: 6.1 K/UL (ref 4.6–13.2)

## 2022-01-24 PROCEDURE — 84439 ASSAY OF FREE THYROXINE: CPT

## 2022-01-24 PROCEDURE — 84443 ASSAY THYROID STIM HORMONE: CPT

## 2022-01-24 PROCEDURE — 36415 COLL VENOUS BLD VENIPUNCTURE: CPT

## 2022-01-24 PROCEDURE — 80061 LIPID PANEL: CPT

## 2022-01-24 PROCEDURE — 82607 VITAMIN B-12: CPT

## 2022-01-24 PROCEDURE — 85025 COMPLETE CBC W/AUTO DIFF WBC: CPT

## 2022-01-24 PROCEDURE — 80053 COMPREHEN METABOLIC PANEL: CPT

## 2022-01-25 PROBLEM — F03.90 DEMENTIA WITHOUT BEHAVIORAL DISTURBANCE (HCC): Status: ACTIVE | Noted: 2022-01-25

## 2022-01-25 PROBLEM — F41.9 CHRONIC ANXIETY: Status: ACTIVE | Noted: 2022-01-25

## 2022-01-25 PROBLEM — J44.9 CHRONIC OBSTRUCTIVE PULMONARY DISEASE (HCC): Status: ACTIVE | Noted: 2022-01-25

## 2022-01-25 NOTE — PROGRESS NOTES
HISTORY OF PRESENT ILLNESS  Daren Hurtado is a 80 y.o. female. 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 Today she reports that she no longer notices any pulsatile tinnitus. She reports persistent left knee pain despite 2 previous arthroplasties. She continues to report memory problems and frequently repeats herself. Her son reports that she has a neurology follow-up scheduled next week. Mr#: [id number]      Past Medical History:   Diagnosis Date    COPD (chronic obstructive pulmonary disease) (HCC)     Depression     Hypertension     Hypertension     Shortness of breath     Thyroid disease     Vertigo        Past Surgical History:   Procedure Laterality Date    HX BREAST BIOPSY      HX CATARACT REMOVAL      HX HYSTERECTOMY      HX KNEE REPLACEMENT Left 2016       Family History   Problem Relation Age of Onset    Hypertension Mother     Hypertension Father     Breast Cancer Child        Allergies   Allergen Reactions    Pcn [Penicillins] Rash       Social History     Tobacco Use   Smoking Status Never Smoker   Smokeless Tobacco Never Used       Social History     Substance and Sexual Activity   Alcohol Use Yes            Patient Active Problem List   Diagnosis Code    Essential hypertension I10    Acquired hypothyroidism E03.9    Chronic anxiety F41.9    Dementia without behavioral disturbance ([facility] F03.90    Chronic obstructive pulmonary disease (HCC) J44.9         Current Outpatient Medications:     fexofenadine (ALLEGRA) 180 mg tablet, Take  by mouth., Disp: , Rfl:     levothyroxine (SYNTHROID) 75 mcg tablet, Take 1 Tablet by mouth Daily (before breakfast). , Disp: 90 Tablet, Rfl: 3    LORazepam (ATIVAN) 0.5 mg tablet, TAKE 1 TABLET BY MOUTH DAILY, Disp: 90 Tablet, Rfl: 1    polyethylene glycol (MIRALAX) 17 gram/dose powder, Mix 1 capful or 17 g in any 8 ounce beverage and drink daily. If constipation continues increase to twice daily, Disp: 289 g, Rfl: 5    furosemide (LASIX) 20 mg tablet, Take 1 Tablet by mouth daily. , Disp: 90 Tablet, Rfl: 1    dilTIAZem ER (Cartia XT) 240 mg capsule, Take 1 Capsule by mouth daily. , Disp: 90 Capsule, Rfl: 1    losartan (COZAAR) 100 mg tablet, Take 1 Tablet by mouth daily. , Disp: 90 Tablet, Rfl: 1    donepeziL (Aricept) 5 mg tablet, Take  by mouth nightly., Disp: , Rfl:     tetrahydrozoline HCl/zinc sulf (EYE DROPS ALLERGY RELIEF OP), Apply 1 Drop to eye two (2) times daily as needed. , Disp: , Rfl:     diclofenac (Voltaren) 1 % gel, Apply  to affected area four (4) times daily. , Disp: , Rfl:     hydrALAZINE (APRESOLINE) 25 mg tablet, Take 1 Tablet by mouth three (3) times daily. , Disp: 270 Tablet, Rfl: 1    aspirin 81 mg chewable tablet, Take 81 mg by mouth., Disp: , Rfl:         Review of Systems   Constitutional: Negative for chills, fever and weight loss. HENT: Negative for congestion, ear pain, hearing loss and sore throat. Eyes: Negative for blurred vision and double vision. Respiratory: Positive for shortness of breath ( with a bad dream). Negative for cough and wheezing. Cardiovascular: Negative for chest pain, palpitations and leg swelling. Gastrointestinal: Positive for constipation. Negative for abdominal pain, blood in stool, diarrhea, heartburn, melena, nausea and vomiting. Genitourinary: Negative for dysuria and urgency. Musculoskeletal: Positive for joint pain (numbness left knee post op). Negative for myalgias. Skin: Negative for itching and rash. Neurological: Negative for dizziness, tingling, sensory change, focal weakness and headaches. Endo/Heme/Allergies: Negative for environmental allergies. Psychiatric/Behavioral: Positive for memory loss. Negative for depression.  The patient is not nervous/anxious and does not have insomnia. Visit Vitals  /64   Pulse (!) 54   Temp 97.4 °F (36.3 °C) (Temporal)   Resp 16   Ht 5' 2\" (1.575 m)   Wt 162 lb (73.5 kg)   SpO2 99%   BMI 29.63 kg/m²       Physical Exam  Vitals and nursing note reviewed. Constitutional:       General: She is not in acute distress. Appearance: Normal appearance. She is not ill-appearing. HENT:      Head: Normocephalic. Right Ear: Tympanic membrane, ear canal and external ear normal.      Left Ear: Tympanic membrane, ear canal and external ear normal.   Eyes:      Extraocular Movements: Extraocular movements intact. Conjunctiva/sclera: Conjunctivae normal.      Pupils: Pupils are equal, round, and reactive to light. Neck:      Vascular: No carotid bruit. Cardiovascular:      Rate and Rhythm: Normal rate and regular rhythm. Heart sounds: Normal heart sounds. Pulmonary:      Effort: Pulmonary effort is normal.      Breath sounds: Normal breath sounds. Abdominal:      Palpations: Abdomen is soft. Tenderness: There is no abdominal tenderness. Musculoskeletal:         General: No deformity. Cervical back: Neck supple. Right lower leg: No edema. Left lower leg: No edema. Comments: Left knee with normal extension, no erythema or other findings to indicate infection, no evidence of effusion   Skin:     General: Skin is warm and dry. Neurological:      Mental Status: She is alert and oriented to person, place, and time. Psychiatric:         Mood and Affect: Mood normal.         Behavior: Behavior normal.       Results for Chavo Davis (MRN [id number]) as of 1/25/2022 07:02   Ref.  Range 1/7/2021 01:41 1/24/2022 09:16   WBC Latest Ref Range: 4.6 - 13.2 K/uL 6.9 6.1   NRBC Latest Ref Range: 0  WBC  0.0   RBC Latest Ref Range: 4.20 - 5.30 M/uL 4.25 3.88 (L)   HGB Latest Ref Range: 12.0 - 16.0 g/dL 13.1 11.5 (L)   HCT Latest Ref Range: 35.0 - 45.0 % 39.5 37.4   MCV Latest Ref Range: 78.0 - 100.0 FL 93 96.4 MCH Latest Ref Range: 24.0 - 34.0 PG 30.8 29.6   MCHC Latest Ref Range: 31.0 - 37.0 g/dL 33.2 30.7 (L)   RDW Latest Ref Range: 11.6 - 14.5 % 12.6 14.3   PLATELET Latest Ref Range: 135 - 420 K/uL 288 245   Results for Karrie Mayers (MRN [id number]) as of 1/25/2022 07:02   Ref. Range 1/7/2021 01:41 1/24/2022 09:16   Sodium Latest Ref Range: 136 - 145 mmol/L 141 143   Potassium Latest Ref Range: 3.5 - 5.5 mmol/L 3.9 3.9   Chloride Latest Ref Range: 100 - 111 mmol/L 102 109   CO2 Latest Ref Range: 21 - 32 mmol/L 24 31   Anion gap Latest Ref Range: 3.0 - 18 mmol/L  3   Glucose Latest Ref Range: 74 - 99 mg/dL 95 85   BUN Latest Ref Range: 7.0 - 18 MG/DL 9 17   Creatinine Latest Ref Range: 0.6 - 1.3 MG/DL 0.82 1.00   BUN/Creatinine ratio Latest Ref Range: 12 - 20   11 (L) 17   Calcium Latest Ref Range: 8.5 - 10.1 MG/DL 10.0 9.3   GFR est non-AA Latest Ref Range: >60 ml/min/1.73m2 65 53 (L)   GFR est AA Latest Ref Range: >60 ml/min/1.73m2 75 >60   Bilirubin, total Latest Ref Range: 0.2 - 1.0 MG/DL 0.4 0.3   Protein, total Latest Ref Range: 6.4 - 8.2 g/dL 7.1 6.8   Albumin Latest Ref Range: 3.4 - 5.0 g/dL 4.4 3.7   Globulin Latest Ref Range: 2.0 - 4.0 g/dL  3.1   A-G Ratio Latest Ref Range: 0.8 - 1.7   1.6 1.2   ALT Latest Ref Range: 13 - 56 U/L 8 13   AST Latest Ref Range: 10 - 38 U/L 17 8 (L)   Alk. phosphatase Latest Ref Range: 45 - 117 U/L 95 92   Triglyceride Latest Ref Range: <150 MG/DL 54 67   Cholesterol, total Latest Ref Range: <200 MG/ 209 (H)   HDL Cholesterol Latest Ref Range: 40 - 60 MG/DL 81 83 (H)   CHOL/HDL Ratio Latest Ref Range: 0 - 5.0    2.5   VLDL, calculated Latest Units: MG/DL 10 13.4   LDL, calculated Latest Ref Range: 0 - 100 MG/DL 94 112.6 (H)   Vitamin B12 Latest Ref Range: 211 - 911 pg/mL >2000 (H) 1,071 (H)   Results for Karrie Mayers (MRN [id number]) as of 1/25/2022 07:02   Ref.  Range 1/7/2021 01:41 1/24/2022 09:16   T4, Free Latest Ref Range: 0.7 - 1.5 NG/DL 1.14 0.8   TSH Latest Ref Range: 0.36 - 3.74 uIU/mL 1.470 3.75 (H)   Glucose Latest Ref Range: 74 - 99 mg/dL 95 85     ASSESSMENT and PLAN    ICD-10-CM ICD-9-CM    1. Medicare annual wellness visit, subsequent  Z00.00 V70.0    2. Essential hypertension  I10 401.9    3. Acquired hypothyroidism  E03.9 244.9 levothyroxine (SYNTHROID) 75 mcg tablet   4. Chronic obstructive pulmonary disease, unspecified COPD type ([facility]  J44.9 496    5. Chronic anxiety  F41.9 300.00    6. Dementia without behavioral disturbance, unspecified dementia type ([facility]  F03.90 294.20    7. Pulsatile tinnitus of left ear  H93. A2 388.30    Assessment:  Hypertension adequately controlled  Euthyroid on current dose of levothyroxine  COPD stable  Anxiety adequately treated  Dementia stable on donepazil  Pulsatile tinnitus/left carotid sheath tumor followed by otolaryngology, stable    Plan:  Continue current medications  Neurology follow-up as scheduled  Return for follow-up in 1 year or problems          Date of Service:  2022   Patient Name:  Ashvin Owen   Patient :  1935     This is a Subsequent Medicare Annual Wellness Visit providing Personalized Prevention Plan Services (PPPS) (Performed 12 months after initial AWV and PPPS )     I have reviewed the patient's medical history in detail and updated the computerized patient record. History     Past Medical History:   Diagnosis Date    COPD (chronic obstructive pulmonary disease) ([facility]     Depression     Hypertension     Hypertension     Shortness of breath     Thyroid disease     Vertigo       Past Surgical History:   Procedure Laterality Date    HX BREAST BIOPSY      HX CATARACT REMOVAL      HX HYSTERECTOMY      HX KNEE REPLACEMENT Left      Current Outpatient Medications   Medication Sig Dispense Refill    fexofenadine (ALLEGRA) 180 mg tablet Take  by mouth.  levothyroxine (SYNTHROID) 75 mcg tablet Take 1 Tablet by mouth Daily (before breakfast).  90 Tablet 3    LORazepam (ATIVAN) 0.5 mg tablet TAKE 1 TABLET BY MOUTH DAILY 90 Tablet 1    polyethylene glycol (MIRALAX) 17 gram/dose powder Mix 1 capful or 17 g in any 8 ounce beverage and drink daily. If constipation continues increase to twice daily 289 g 5    furosemide (LASIX) 20 mg tablet Take 1 Tablet by mouth daily. 90 Tablet 1    dilTIAZem ER (Cartia XT) 240 mg capsule Take 1 Capsule by mouth daily. 90 Capsule 1    losartan (COZAAR) 100 mg tablet Take 1 Tablet by mouth daily. 90 Tablet 1    donepeziL (Aricept) 5 mg tablet Take  by mouth nightly.  tetrahydrozoline HCl/zinc sulf (EYE DROPS ALLERGY RELIEF OP) Apply 1 Drop to eye two (2) times daily as needed.  diclofenac (Voltaren) 1 % gel Apply  to affected area four (4) times daily.  hydrALAZINE (APRESOLINE) 25 mg tablet Take 1 Tablet by mouth three (3) times daily. 270 Tablet 1    aspirin 81 mg chewable tablet Take 81 mg by mouth.        Allergies   Allergen Reactions    Pcn [Penicillins] Rash     Family History   Problem Relation Age of Onset    Hypertension Mother     Hypertension Father     Breast Cancer Child      Social History     Tobacco Use    Smoking status: Never Smoker    Smokeless tobacco: Never Used   Substance Use Topics    Alcohol use: Yes     Patient Active Problem List   Diagnosis Code    Essential hypertension I10    Acquired hypothyroidism E03.9    Chronic anxiety F41.9    Dementia without behavioral disturbance ([facility] F03.90    Chronic obstructive pulmonary disease ([facility] J44.9       Living situation:   -- Lives  with family  -- Khurram Lira in home yes    Diet, Lifestyle: nonsmoker    Exercise level: moderately active    Depression Risk Factor Screening:     3 most recent PHQ Screens 12/2/2021   PHQ Not Done -   Little interest or pleasure in doing things Not at all   Feeling down, depressed, irritable, or hopeless Nearly every day   Total Score PHQ 2 3   Trouble falling or staying asleep, or sleeping too much Not at all   Feeling tired or having little energy Nearly every day   Poor appetite, weight loss, or overeating Not at all   Feeling bad about yourself - or that you are a failure or have let yourself or your family down Not at all   Trouble concentrating on things such as school, work, reading, or watching TV Not at all   Moving or speaking so slowly that other people could have noticed; or the opposite being so fidgety that others notice Not at all   Thoughts of being better off dead, or hurting yourself in some way Not at all   PHQ 9 Score 6   How difficult have these problems made it for you to do your work, take care of your home and get along with others Somewhat difficult     Alcohol Risk Factor Screening:   Do you average more than 1 drink per night or more than 7 drinks a week:  No    On any one occasion in the past three months have you have had more than 3 drinks containing alcohol:  No    Functional Ability and Level of Safety:     Hearing Loss   Decreased hearing left ear    Activities of Daily Living   Self-care. Requires assistance with: no ADLs    Fall Risk     Fall Risk Assessment, last 12 mths 3/29/2021   Able to walk? Yes   Fall in past 12 months? 0   Do you feel unsteady? 1   Are you worried about falling 0   Is the gait abnormal? 0   Number of falls in past 12 months -   Fall with injury? -     Abuse Screen   Patient is not abused    Review of Systems   Review of systems reported in the separate problem-oriented documentation. Physical Examination   Physical exam reported in the separate problem-oriented documentation.     Evaluation of Cognitive Function:  Mood/affect:  neutral  Appearance: age appropriate  Family member/caregiver input: Son    Patient Care Team:  Wang Clinton MD as PCP - General (Family Medicine)  Wang Clinton MD as PCP - REHABILITATION [facility] Empaneled Provider    Advice/Counseling   Education and counseling provided:  Advance directive counseling/discussion      Assessment/Plan       ICD-10-CM ICD-9-CM    1. Medicare annual wellness visit, subsequent  Z00.00 V70.0    2. Essential hypertension  I10 401.9    3. Acquired hypothyroidism  E03.9 244.9 levothyroxine (SYNTHROID) 75 mcg tablet   4. Chronic obstructive pulmonary disease, unspecified COPD type ([facility]  J44.9 496    5. Chronic anxiety  F41.9 300.00    6. Dementia without behavioral disturbance, unspecified dementia type ([facility]  F03.90 294.20    7. Pulsatile tinnitus of left ear  H93. A2 388.30        5-year personalized preventative services plan:  Consider Shingrix immunization to reduce the risk of shingles, available at the pharmacy  Medicare wellness evaluation due January 2023                Alejandro Pierre was provided a written 5-year personalized preventive services plan, which was included in her AVS.    Sawyer Brown MD      PLEASE NOTE:   This document has been produced using voice recognition software. Unrecognized errors in transcription may be present.

## 2022-01-26 ENCOUNTER — OFFICE VISIT (OUTPATIENT)
Dept: FAMILY MEDICINE CLINIC | Age: 87
End: 2022-01-26
Payer: MEDICARE

## 2022-01-26 VITALS
OXYGEN SATURATION: 99 % | DIASTOLIC BLOOD PRESSURE: 64 MMHG | HEART RATE: 54 BPM | TEMPERATURE: 97.4 F | RESPIRATION RATE: 16 BRPM | HEIGHT: 62 IN | WEIGHT: 162 LBS | SYSTOLIC BLOOD PRESSURE: 130 MMHG | BODY MASS INDEX: 29.81 KG/M2

## 2022-01-26 DIAGNOSIS — E03.9 ACQUIRED HYPOTHYROIDISM: ICD-10-CM

## 2022-01-26 DIAGNOSIS — H93.A2 PULSATILE TINNITUS OF LEFT EAR: ICD-10-CM

## 2022-01-26 DIAGNOSIS — J44.9 CHRONIC OBSTRUCTIVE PULMONARY DISEASE, UNSPECIFIED COPD TYPE (HCC): ICD-10-CM

## 2022-01-26 DIAGNOSIS — Z00.00 MEDICARE ANNUAL WELLNESS VISIT, SUBSEQUENT: Primary | ICD-10-CM

## 2022-01-26 DIAGNOSIS — I10 ESSENTIAL HYPERTENSION: ICD-10-CM

## 2022-01-26 DIAGNOSIS — F03.90 DEMENTIA WITHOUT BEHAVIORAL DISTURBANCE, UNSPECIFIED DEMENTIA TYPE: ICD-10-CM

## 2022-01-26 DIAGNOSIS — F41.9 CHRONIC ANXIETY: ICD-10-CM

## 2022-01-26 PROCEDURE — 1101F PT FALLS ASSESS-DOCD LE1/YR: CPT | Performed by: FAMILY MEDICINE

## 2022-01-26 PROCEDURE — G0439 PPPS, SUBSEQ VISIT: HCPCS | Performed by: FAMILY MEDICINE

## 2022-01-26 PROCEDURE — G8427 DOCREV CUR MEDS BY ELIG CLIN: HCPCS | Performed by: FAMILY MEDICINE

## 2022-01-26 PROCEDURE — G8432 DEP SCR NOT DOC, RNG: HCPCS | Performed by: FAMILY MEDICINE

## 2022-01-26 PROCEDURE — G8419 CALC BMI OUT NRM PARAM NOF/U: HCPCS | Performed by: FAMILY MEDICINE

## 2022-01-26 PROCEDURE — G8536 NO DOC ELDER MAL SCRN: HCPCS | Performed by: FAMILY MEDICINE

## 2022-01-26 RX ORDER — MINERAL OIL
ENEMA (ML) RECTAL
COMMUNITY

## 2022-01-26 RX ORDER — LEVOTHYROXINE SODIUM 75 UG/1
75 TABLET ORAL
Qty: 90 TABLET | Refills: 3 | Status: SHIPPED | OUTPATIENT
Start: 2022-01-26

## 2022-01-26 NOTE — PATIENT INSTRUCTIONS
5-year personalized preventative services plan:  Consider Shingrix immunization to reduce the risk of shingles, available at the pharmacy  Medicare wellness evaluation due January 2023    Continue current medications  Neurology follow-up as scheduled  Return for follow-up in 1 year or problems

## 2022-03-18 PROBLEM — F41.9 CHRONIC ANXIETY: Status: ACTIVE | Noted: 2022-01-25

## 2022-03-18 PROBLEM — I10 ESSENTIAL HYPERTENSION: Status: ACTIVE | Noted: 2019-04-11

## 2022-03-19 PROBLEM — J44.9 CHRONIC OBSTRUCTIVE PULMONARY DISEASE (HCC): Status: ACTIVE | Noted: 2022-01-25

## 2022-03-19 PROBLEM — F03.90 DEMENTIA WITHOUT BEHAVIORAL DISTURBANCE (HCC): Status: ACTIVE | Noted: 2022-01-25

## 2022-03-19 PROBLEM — E03.9 ACQUIRED HYPOTHYROIDISM: Status: ACTIVE | Noted: 2019-04-11

## 2022-03-24 DIAGNOSIS — Z12.31 BREAST CANCER SCREENING BY MAMMOGRAM: ICD-10-CM

## 2022-03-28 DIAGNOSIS — Z78.0 ASYMPTOMATIC POSTMENOPAUSAL ESTROGEN DEFICIENCY: ICD-10-CM

## 2022-03-30 DIAGNOSIS — R92.8 ABNORMAL MAMMOGRAM OF BOTH BREASTS: ICD-10-CM

## 2022-04-18 RX ORDER — DILTIAZEM HYDROCHLORIDE 240 MG/1
240 CAPSULE, COATED, EXTENDED RELEASE ORAL DAILY
Qty: 90 CAPSULE | Refills: 1 | Status: SHIPPED | OUTPATIENT
Start: 2022-04-18 | End: 2022-10-08

## 2022-04-18 RX ORDER — LOSARTAN POTASSIUM 100 MG/1
100 TABLET ORAL DAILY
Qty: 90 TABLET | Refills: 1 | Status: SHIPPED | OUTPATIENT
Start: 2022-04-18 | End: 2022-10-08

## 2022-04-18 RX ORDER — HYDRALAZINE HYDROCHLORIDE 25 MG/1
TABLET, FILM COATED ORAL
Qty: 270 TABLET | Refills: 1 | Status: SHIPPED | OUTPATIENT
Start: 2022-04-18 | End: 2022-10-08

## 2022-04-18 RX ORDER — FUROSEMIDE 20 MG/1
20 TABLET ORAL DAILY
Qty: 90 TABLET | Refills: 1 | Status: SHIPPED | OUTPATIENT
Start: 2022-04-18 | End: 2022-10-08

## 2022-08-10 ENCOUNTER — TELEPHONE (OUTPATIENT)
Dept: FAMILY MEDICINE CLINIC | Age: 87
End: 2022-08-10

## 2022-08-10 NOTE — TELEPHONE ENCOUNTER
----- Message from Dakota Trujillo sent at 8/10/2022 10:47 AM EDT -----  Subject: Message to Provider    QUESTIONS  Information for Provider? patient is wanting to know if she needs to come   in for blood work for her upcoming appt.  on 8/16  ---------------------------------------------------------------------------  --------------  Julianna OLVERA  [phone]; OK to leave message on voicemail  ---------------------------------------------------------------------------  --------------  SCRIPT ANSWERS  undefined

## 2022-08-11 NOTE — TELEPHONE ENCOUNTER
Daughter notified per Dr Daniella Ortiz notes there was no request for additional labs or repeat labs from her January wellness. She voiced understanding .

## 2022-08-16 DIAGNOSIS — E03.9 ACQUIRED HYPOTHYROIDISM: ICD-10-CM

## 2022-08-16 DIAGNOSIS — I10 ESSENTIAL HYPERTENSION: Primary | ICD-10-CM

## 2022-08-30 ENCOUNTER — OFFICE VISIT (OUTPATIENT)
Dept: FAMILY MEDICINE CLINIC | Age: 87
End: 2022-08-30
Payer: MEDICARE

## 2022-08-30 VITALS
OXYGEN SATURATION: 97 % | DIASTOLIC BLOOD PRESSURE: 58 MMHG | HEART RATE: 59 BPM | WEIGHT: 141.2 LBS | BODY MASS INDEX: 25.98 KG/M2 | RESPIRATION RATE: 16 BRPM | TEMPERATURE: 97 F | SYSTOLIC BLOOD PRESSURE: 109 MMHG | HEIGHT: 62 IN

## 2022-08-30 DIAGNOSIS — J44.9 CHRONIC OBSTRUCTIVE PULMONARY DISEASE, UNSPECIFIED COPD TYPE (HCC): ICD-10-CM

## 2022-08-30 DIAGNOSIS — N28.9 MILD RENAL INSUFFICIENCY: ICD-10-CM

## 2022-08-30 DIAGNOSIS — I10 ESSENTIAL HYPERTENSION: Primary | ICD-10-CM

## 2022-08-30 DIAGNOSIS — E03.9 ACQUIRED HYPOTHYROIDISM: ICD-10-CM

## 2022-08-30 DIAGNOSIS — H93.A2 PULSATILE TINNITUS OF LEFT EAR: ICD-10-CM

## 2022-08-30 DIAGNOSIS — F03.90 DEMENTIA WITHOUT BEHAVIORAL DISTURBANCE, UNSPECIFIED DEMENTIA TYPE: ICD-10-CM

## 2022-08-30 PROCEDURE — G8536 NO DOC ELDER MAL SCRN: HCPCS | Performed by: FAMILY MEDICINE

## 2022-08-30 PROCEDURE — 1101F PT FALLS ASSESS-DOCD LE1/YR: CPT | Performed by: FAMILY MEDICINE

## 2022-08-30 PROCEDURE — G8510 SCR DEP NEG, NO PLAN REQD: HCPCS | Performed by: FAMILY MEDICINE

## 2022-08-30 PROCEDURE — 1123F ACP DISCUSS/DSCN MKR DOCD: CPT | Performed by: FAMILY MEDICINE

## 2022-08-30 PROCEDURE — G8427 DOCREV CUR MEDS BY ELIG CLIN: HCPCS | Performed by: FAMILY MEDICINE

## 2022-08-30 PROCEDURE — 99213 OFFICE O/P EST LOW 20 MIN: CPT | Performed by: FAMILY MEDICINE

## 2022-08-30 PROCEDURE — 1090F PRES/ABSN URINE INCON ASSESS: CPT | Performed by: FAMILY MEDICINE

## 2022-08-30 PROCEDURE — G8417 CALC BMI ABV UP PARAM F/U: HCPCS | Performed by: FAMILY MEDICINE

## 2022-08-30 RX ORDER — DONEPEZIL HYDROCHLORIDE 10 MG/1
10 TABLET, FILM COATED ORAL DAILY
COMMUNITY
Start: 2022-07-28

## 2022-08-30 RX ORDER — PAROXETINE 10 MG/1
10 TABLET, FILM COATED ORAL DAILY
COMMUNITY
Start: 2022-08-10

## 2022-08-30 NOTE — PATIENT INSTRUCTIONS
Assessment:  Hypertension adequately controlled based on reports of home blood pressure levels and readings at other medical offices  Satisfactory renal function and thyroid levels as of January 2022  Dementia essentially unchanged  Mild persistent symptoms of pulsatile tinnitus, currently following with otolaryngology regarding the carotid sheath tumor    Health maintenance recommendations:  COVID-19 immunization #4    Plan:  Continue current medications  Schedule lab appointment followed by Medicare wellness evaluation in January, return sooner with any problems  Please always arrive 15 minutes before your scheduled appointment time

## 2022-08-30 NOTE — PROGRESS NOTES
HISTORY OF PRESENT ILLNESS  Jonnathan Duron is a 80 y.o. female. 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 Mr#: [id number]      Past Medical History:   Diagnosis Date    COPD (chronic obstructive pulmonary disease) (HCC)     Depression     Hypertension     Hypertension     Shortness of breath     Thyroid disease     Vertigo        Past Surgical History:   Procedure Laterality Date    HX BREAST BIOPSY      HX CATARACT REMOVAL      HX HYSTERECTOMY      HX KNEE REPLACEMENT Left 2016       Family History   Problem Relation Age of Onset    Hypertension Mother     Hypertension Father     Breast Cancer Child        Allergies   Allergen Reactions    Pcn [Penicillins] Rash       Social History     Tobacco Use   Smoking Status Never   Smokeless Tobacco Never       Social History     Substance and Sexual Activity   Alcohol Use Yes            Patient Active Problem List   Diagnosis Code    Essential hypertension I10    Acquired hypothyroidism E03.9    Chronic anxiety F41.9    Dementia without behavioral disturbance (HCC) F03.90    Chronic obstructive pulmonary disease (HCC) J44.9         Current Outpatient Medications:     donepeziL (ARICEPT) 10 mg tablet, Take 10 mg by mouth daily. , Disp: , Rfl:     hydrALAZINE (APRESOLINE) 25 mg tablet, TAKE 1 TABLET BY MOUTH THREE TIMES DAILY, Disp: 270 Tablet, Rfl: 1    furosemide (LASIX) 20 mg tablet, TAKE 1 TABLET BY MOUTH DAILY, Disp: 90 Tablet, Rfl: 1    losartan (COZAAR) 100 mg tablet, TAKE 1 TABLET BY MOUTH DAILY, Disp: 90 Tablet, Rfl: 1    dilTIAZem ER (CARDIZEM CD) 240 mg capsule, TAKE 1 CAPSULE BY MOUTH DAILY, Disp: 90 Capsule, Rfl: 1    fexofenadine (ALLEGRA) 180 mg tablet, Take  by mouth., Disp: , Rfl:     levothyroxine (SYNTHROID) 75 mcg tablet, Take 1 Tablet by mouth Daily (before breakfast). , Disp: 90 Tablet, Rfl: 3    polyethylene glycol (MIRALAX) 17 gram/dose powder, Mix 1 capful or 17 g in any 8 ounce beverage and drink daily. If constipation continues increase to twice daily, Disp: 289 g, Rfl: 5    tetrahydrozoline HCl/zinc sulf (EYE DROPS ALLERGY RELIEF OP), Apply 1 Drop to eye two (2) times daily as needed. , Disp: , Rfl:     diclofenac (VOLTAREN) 1 % gel, Apply  to affected area four (4) times daily. , Disp: , Rfl:     aspirin 81 mg chewable tablet, Take 81 mg by mouth., Disp: , Rfl:     PARoxetine (PAXIL) 10 mg tablet, Take 10 mg by mouth daily. , Disp: , Rfl:         Review of Systems   Constitutional:  Negative for fever and weight loss. HENT:  Positive for tinnitus (Mild residual pulsatile tinnitus left ear). Eyes:  Positive for discharge (Watery eyes, seems worse when she is outside, already taking an antihistamine and using eyedrops without much improvement). Respiratory:  Negative for shortness of breath. Cardiovascular:  Negative for chest pain and palpitations. Neurological:  Negative for dizziness and headaches. Psychiatric/Behavioral:  Positive for memory loss. Negative for depression. Visit Vitals  BP (!) 109/58 (BP 1 Location: Left arm, BP Patient Position: Sitting, BP Cuff Size: Adult)   Pulse (!) 59   Temp 97 °F (36.1 °C) (Temporal)   Resp 16   Ht 5' 2\" (1.575 m)   Wt 141 lb 3.2 oz (64 kg)   SpO2 97%   BMI 25.83 kg/m²       Physical Exam  Vitals and nursing note reviewed. Constitutional:       General: She is not in acute distress. Appearance: Normal appearance. She is well-developed. She is not ill-appearing. HENT:      Head: Normocephalic. Right Ear: Tympanic membrane and ear canal normal.      Left Ear: Tympanic membrane and ear canal normal.      Mouth/Throat:      Mouth: Mucous membranes are moist.      Pharynx: Oropharynx is clear. Eyes:      Extraocular Movements: Extraocular movements intact.       Conjunctiva/sclera: Conjunctivae normal.   Cardiovascular:      Rate and Rhythm: Normal rate and regular rhythm. Heart sounds: Normal heart sounds. Pulmonary:      Effort: Pulmonary effort is normal.      Breath sounds: Normal breath sounds. Musculoskeletal:      Cervical back: Neck supple. Lymphadenopathy:      Cervical: No cervical adenopathy. Skin:     General: Skin is warm and dry. Neurological:      Mental Status: She is alert and oriented to person, place, and time. Psychiatric:         Behavior: Behavior normal.       ASSESSMENT and PLAN    ICD-10-CM ICD-9-CM    1. Essential hypertension  I10 401.9 CBC W/O DIFF      URINALYSIS W/ RFLX MICROSCOPIC      METABOLIC PANEL, COMPREHENSIVE      2. Mild renal insufficiency  U35.0 258.7 METABOLIC PANEL, COMPREHENSIVE      3. Acquired hypothyroidism  E03.9 244.9 TSH 3RD GENERATION      T4, FREE      4. Chronic obstructive pulmonary disease, unspecified COPD type ([facility]  J44.9 496       5. Dementia without behavioral disturbance, unspecified dementia type ([facility]  F03.90 294.20       6. Pulsatile tinnitus of left ear  H93. A2 388.30       Assessment:  Hypertension adequately controlled based on reports of home blood pressure levels and readings at other medical offices  Satisfactory renal function and thyroid levels as of January 2022  Dementia essentially unchanged  Mild persistent symptoms of pulsatile tinnitus, currently following with otolaryngology regarding the carotid sheath tumor    Health maintenance recommendations:  COVID-19 immunization #4    Plan:  Continue current medications  Schedule lab appointment followed by Medicare wellness evaluation in January, return sooner with any problems  Please always arrive 15 minutes before your scheduled appointment time  Frank Gates MD      PLEASE NOTE:   This document has been produced using voice recognition software. Unrecognized errors in transcription may be present.

## 2022-08-30 NOTE — PROGRESS NOTES
Odelia Najjar is a 80 y.o. female (: 1935) presenting to address:    Chief Complaint   Patient presents with    Follow-up       Vitals:    22 1530   BP: (!) 109/58   Pulse: (!) 59   Resp: 16   Temp: 97 °F (36.1 °C)   TempSrc: Temporal   SpO2: 97%   Weight: 141 lb 3.2 oz (64 kg)   Height: 5' 2\" (1.575 m)   PainSc:   0 - No pain       Hearing/Vision:   No results found. Learning Assessment:     Learning Assessment 2021   PRIMARY LEARNER Patient   HIGHEST LEVEL OF EDUCATION - PRIMARY LEARNER  DID NOT GRADUATE HIGH SCHOOL   BARRIERS PRIMARY LEARNER NONE   CO-LEARNER CAREGIVER No   PRIMARY LANGUAGE ENGLISH    NEED No   LEARNER PREFERENCE PRIMARY LISTENING   ANSWERED BY patient   RELATIONSHIP SELF     Depression Screening:     3 most recent PHQ Screens 2022   PHQ Not Done -   Little interest or pleasure in doing things Not at all   Feeling down, depressed, irritable, or hopeless Not at all   Total Score PHQ 2 0   Trouble falling or staying asleep, or sleeping too much -   Feeling tired or having little energy -   Poor appetite, weight loss, or overeating -   Feeling bad about yourself - or that you are a failure or have let yourself or your family down -   Trouble concentrating on things such as school, work, reading, or watching TV -   Moving or speaking so slowly that other people could have noticed; or the opposite being so fidgety that others notice -   Thoughts of being better off dead, or hurting yourself in some way -   PHQ 9 Score -   How difficult have these problems made it for you to do your work, take care of your home and get along with others -     Fall Risk Assessment:     Fall Risk Assessment, last 12 mths 2022   Able to walk? Yes   Fall in past 12 months? 0   Do you feel unsteady? 0   Are you worried about falling 0   Is the gait abnormal? -   Number of falls in past 12 months -   Fall with injury?  -     Abuse Screening:     Abuse Screening Questionnaire 2021 Do you ever feel afraid of your partner? N   Are you in a relationship with someone who physically or mentally threatens you? N   Is it safe for you to go home? Y     ADL Assessment:     ADL Assessment 1/26/2022   Feeding yourself No Help Needed   Getting from bed to chair No Help Needed   Getting dressed No Help Needed   Bathing or showering No Help Needed   Walk across the room (includes cane/walker) No Help Needed   Using the telphone No Help Needed   Taking your medications Help Needed   Preparing meals Help Needed   Managing money (expenses/bills) Help Needed   Moderately strenuous housework (laundry) No Help Needed   Shopping for personal items (toiletries/medicines) Help Needed   Shopping for groceries Help Needed   Driving Help Needed   Climbing a flight of stairs No Help Needed   Getting to places beyond walking distances Help Needed        Coordination of Care Questionaire:   1. \"Have you been to the ER, urgent care clinic since your last visit? Hospitalized since your last visit? \" No    2. \"Have you seen or consulted any other health care providers outside of the [address] [place] [address] since your last visit? \" No     3. For patients aged 39-70: Has the patient had a colonoscopy / FIT/ Cologuard? NA - based on age      If the patient is female:    4. For patients aged 41-77: Has the patient had a mammogram within the past 2 years? NA - based on age or sex  See top three    5. For patients aged 21-65: Has the patient had a pap smear? NA - based on age or sex    Advanced Directive:   1. Do you have an Advanced Directive? NO    2. Would you like information on Advanced Directives?  NO

## 2022-10-08 RX ORDER — HYDRALAZINE HYDROCHLORIDE 25 MG/1
TABLET, FILM COATED ORAL
Qty: 270 TABLET | Refills: 1 | Status: SHIPPED | OUTPATIENT
Start: 2022-10-08

## 2022-10-08 RX ORDER — FUROSEMIDE 20 MG/1
20 TABLET ORAL DAILY
Qty: 90 TABLET | Refills: 1 | Status: SHIPPED | OUTPATIENT
Start: 2022-10-08

## 2022-10-08 RX ORDER — DILTIAZEM HYDROCHLORIDE 240 MG/1
240 CAPSULE, COATED, EXTENDED RELEASE ORAL DAILY
Qty: 90 CAPSULE | Refills: 1 | Status: SHIPPED | OUTPATIENT
Start: 2022-10-08

## 2022-10-08 RX ORDER — LOSARTAN POTASSIUM 100 MG/1
100 TABLET ORAL DAILY
Qty: 90 TABLET | Refills: 1 | Status: SHIPPED | OUTPATIENT
Start: 2022-10-08

## 2022-12-24 DIAGNOSIS — E03.9 ACQUIRED HYPOTHYROIDISM: ICD-10-CM

## 2022-12-27 RX ORDER — LEVOTHYROXINE SODIUM 75 UG/1
TABLET ORAL
Qty: 90 TABLET | Refills: 0 | Status: SHIPPED | OUTPATIENT
Start: 2022-12-27

## 2023-01-10 ENCOUNTER — APPOINTMENT (OUTPATIENT)
Dept: FAMILY MEDICINE CLINIC | Age: 88
End: 2023-01-10

## 2023-01-10 DIAGNOSIS — I10 ESSENTIAL HYPERTENSION: ICD-10-CM

## 2023-01-10 DIAGNOSIS — E03.9 ACQUIRED HYPOTHYROIDISM: ICD-10-CM

## 2023-01-11 LAB
ALBUMIN SERPL-MCNC: 4.1 G/DL (ref 3.6–4.6)
ALBUMIN/GLOB SERPL: 1.3 {RATIO} (ref 1.2–2.2)
ALP SERPL-CCNC: 86 IU/L (ref 44–121)
ALT SERPL-CCNC: 6 IU/L (ref 0–32)
APPEARANCE UR: CLEAR
AST SERPL-CCNC: 17 IU/L (ref 0–40)
BACTERIA #/AREA URNS HPF: NORMAL /[HPF]
BASOPHILS # BLD AUTO: 0 X10E3/UL (ref 0–0.2)
BASOPHILS NFR BLD AUTO: 1 %
BILIRUB SERPL-MCNC: 0.4 MG/DL (ref 0–1.2)
BILIRUB UR QL STRIP: NEGATIVE
BUN SERPL-MCNC: 10 MG/DL (ref 8–27)
BUN/CREAT SERPL: 9 (ref 12–28)
CALCIUM SERPL-MCNC: 10 MG/DL (ref 8.7–10.3)
CASTS URNS QL MICRO: NORMAL /LPF
CHLORIDE SERPL-SCNC: 99 MMOL/L (ref 96–106)
CO2 SERPL-SCNC: 26 MMOL/L (ref 20–29)
COLOR UR: YELLOW
CREAT SERPL-MCNC: 1.09 MG/DL (ref 0.57–1)
EGFR: 49 ML/MIN/1.73
EOSINOPHIL # BLD AUTO: 0.1 X10E3/UL (ref 0–0.4)
EOSINOPHIL NFR BLD AUTO: 1 %
EPI CELLS #/AREA URNS HPF: NORMAL /HPF (ref 0–10)
ERYTHROCYTE [DISTWIDTH] IN BLOOD BY AUTOMATED COUNT: 13.1 % (ref 11.7–15.4)
GLOBULIN SER CALC-MCNC: 3.2 G/DL (ref 1.5–4.5)
GLUCOSE SERPL-MCNC: 92 MG/DL (ref 70–99)
GLUCOSE UR QL STRIP: NEGATIVE
HCT VFR BLD AUTO: 37.3 % (ref 34–46.6)
HGB BLD-MCNC: 12.3 G/DL (ref 11.1–15.9)
HGB UR QL STRIP: NEGATIVE
IMM GRANULOCYTES # BLD AUTO: 0.1 X10E3/UL (ref 0–0.1)
IMM GRANULOCYTES NFR BLD AUTO: 1 %
INTERPRETATION: NORMAL
KETONES UR QL STRIP: NEGATIVE
LEUKOCYTE ESTERASE UR QL STRIP: ABNORMAL
LYMPHOCYTES # BLD AUTO: 2.1 X10E3/UL (ref 0.7–3.1)
LYMPHOCYTES NFR BLD AUTO: 27 %
MCH RBC QN AUTO: 29.9 PG (ref 26.6–33)
MCHC RBC AUTO-ENTMCNC: 33 G/DL (ref 31.5–35.7)
MCV RBC AUTO: 91 FL (ref 79–97)
MICRO URNS: ABNORMAL
MONOCYTES # BLD AUTO: 0.6 X10E3/UL (ref 0.1–0.9)
MONOCYTES NFR BLD AUTO: 8 %
NEUTROPHILS # BLD AUTO: 4.8 X10E3/UL (ref 1.4–7)
NEUTROPHILS NFR BLD AUTO: 62 %
NITRITE UR QL STRIP: NEGATIVE
PH UR STRIP: 6 [PH] (ref 5–7.5)
PLATELET # BLD AUTO: 352 X10E3/UL (ref 150–450)
POTASSIUM SERPL-SCNC: 4.3 MMOL/L (ref 3.5–5.2)
PROT SERPL-MCNC: 7.3 G/DL (ref 6–8.5)
PROT UR QL STRIP: NEGATIVE
RBC # BLD AUTO: 4.11 X10E6/UL (ref 3.77–5.28)
RBC #/AREA URNS HPF: NORMAL /HPF (ref 0–2)
SODIUM SERPL-SCNC: 140 MMOL/L (ref 134–144)
SP GR UR STRIP: 1.02 (ref 1–1.03)
T4 FREE SERPL-MCNC: 1.23 NG/DL (ref 0.82–1.77)
TSH SERPL DL<=0.005 MIU/L-ACNC: 1.92 UIU/ML (ref 0.45–4.5)
UROBILINOGEN UR STRIP-MCNC: 0.2 MG/DL (ref 0.2–1)
WBC # BLD AUTO: 7.7 X10E3/UL (ref 3.4–10.8)
WBC #/AREA URNS HPF: NORMAL /HPF (ref 0–5)

## 2023-01-16 PROBLEM — F32.A ANXIETY AND DEPRESSION: Status: ACTIVE | Noted: 2022-01-25

## 2023-01-16 PROBLEM — H93.A2 PULSATILE TINNITUS OF LEFT EAR: Status: ACTIVE | Noted: 2023-01-16

## 2023-01-16 PROBLEM — F41.9 ANXIETY AND DEPRESSION: Status: ACTIVE | Noted: 2022-01-25

## 2023-01-16 NOTE — PROGRESS NOTES
HISTORY OF PRESENT ILLNESS  Gris Ferraro is a 80 y.o. female. 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 Mr#: [id number]      Past Medical History:   Diagnosis Date    COPD (chronic obstructive pulmonary disease) (HCC)     Depression     Hypertension     Hypertension     Shortness of breath     Thyroid disease     Vertigo        Past Surgical History:   Procedure Laterality Date    HX BREAST BIOPSY      HX CATARACT REMOVAL      HX HYSTERECTOMY      HX KNEE REPLACEMENT Left 2016       Family History   Problem Relation Age of Onset    Hypertension Mother     Hypertension Father     Breast Cancer Child        Allergies   Allergen Reactions    Pcn [Penicillins] Rash       Social History     Tobacco Use   Smoking Status Never   Smokeless Tobacco Never       Social History     Substance and Sexual Activity   Alcohol Use Yes            Patient Active Problem List   Diagnosis Code    Essential hypertension I10    Acquired hypothyroidism E03.9    Anxiety and depression F41.9, F32. A    Dementia without behavioral disturbance (HCC) F03.90    Chronic obstructive pulmonary disease (HCC) J44.9    Pulsatile tinnitus of left ear H93. A2         Current Outpatient Medications:     levothyroxine (SYNTHROID) 75 mcg tablet, TAKE 1 TABLET BY MOUTH DAILY BEFORE BREAKFAST, Disp: 90 Tablet, Rfl: 0    hydrALAZINE (APRESOLINE) 25 mg tablet, TAKE 1 TABLET BY MOUTH THREE TIMES DAILY, Disp: 270 Tablet, Rfl: 1    dilTIAZem ER (CARDIZEM CD) 240 mg capsule, TAKE 1 CAPSULE BY MOUTH DAILY, Disp: 90 Capsule, Rfl: 1    losartan (COZAAR) 100 mg tablet, TAKE 1 TABLET BY MOUTH DAILY, Disp: 90 Tablet, Rfl: 1    furosemide (LASIX) 20 mg tablet, TAKE 1 TABLET BY MOUTH DAILY, Disp: 90 Tablet, Rfl: 1    PARoxetine (PAXIL) 10 mg tablet, Take 10 mg by mouth daily. , Disp: , Rfl:     donepeziL (ARICEPT) 10 mg tablet, Take 10 mg by mouth daily. , Disp: , Rfl:     fexofenadine (ALLEGRA) 180 mg tablet, Take  by mouth., Disp: , Rfl:     polyethylene glycol (MIRALAX) 17 gram/dose powder, Mix 1 capful or 17 g in any 8 ounce beverage and drink daily. If constipation continues increase to twice daily (Patient taking differently: Mix 1 capful or 17 g in any 8 ounce beverage and drink daily. If constipation continues increase to twice daily), Disp: 289 g, Rfl: 5    tetrahydrozoline HCl/zinc sulf (EYE DROPS ALLERGY RELIEF OP), Apply 1 Drop to eye two (2) times daily as needed. , Disp: , Rfl:     diclofenac (VOLTAREN) 1 % gel, Apply  to affected area four (4) times daily. , Disp: , Rfl:     aspirin 81 mg chewable tablet, Take 81 mg by mouth., Disp: , Rfl:         Review of Systems   Constitutional:  Negative for chills, fever and weight loss. HENT:  Negative for congestion, ear pain, hearing loss and sore throat. Eyes:  Negative for blurred vision and double vision. Respiratory:  Negative for cough, shortness of breath and wheezing. Cardiovascular:  Negative for chest pain, palpitations and leg swelling. Gastrointestinal:  Negative for abdominal pain, blood in stool, constipation, diarrhea, heartburn, melena, nausea and vomiting. Genitourinary:  Negative for dysuria and urgency. Musculoskeletal:  Positive for joint pain (left knee pain). Negative for myalgias. Skin:  Negative for itching and rash. Neurological:  Negative for dizziness, tingling, sensory change, focal weakness and headaches. Endo/Heme/Allergies:  Positive for environmental allergies. Psychiatric/Behavioral:  Negative for depression. The patient is not nervous/anxious and does not have insomnia.       Visit Vitals  /74   Pulse (!) 55   Temp 97.6 °F (36.4 °C) (Temporal)   Resp 16   Ht 5' 2\" (1.575 m)   Wt 140 lb (63.5 kg)   SpO2 97%   BMI 25.61 kg/m²       Physical Exam  Vitals and nursing note reviewed. Constitutional:       General: She is not in acute distress. Appearance: Normal appearance. She is not ill-appearing. HENT:      Head: Normocephalic. Right Ear: Tympanic membrane, ear canal and external ear normal.      Left Ear: Tympanic membrane, ear canal and external ear normal.      Mouth/Throat:      Mouth: Mucous membranes are moist.      Pharynx: Oropharynx is clear. Eyes:      Extraocular Movements: Extraocular movements intact. Conjunctiva/sclera: Conjunctivae normal.      Pupils: Pupils are equal, round, and reactive to light. Neck:      Vascular: No carotid bruit (Left carotid bruit not heard at this exam, patient continues to experience pulsatile tinnitus on the left). Cardiovascular:      Rate and Rhythm: Normal rate and regular rhythm. Heart sounds: Murmur (Precordial systolic murmur) heard. Pulmonary:      Effort: Pulmonary effort is normal.      Breath sounds: Normal breath sounds. Abdominal:      Palpations: Abdomen is soft. Tenderness: There is no abdominal tenderness. Musculoskeletal:         General: No deformity. Cervical back: Neck supple. Right lower leg: No edema. Left lower leg: No edema. Skin:     General: Skin is warm and dry. Neurological:      Mental Status: She is alert and oriented to person, place, and time. Psychiatric:         Mood and Affect: Mood normal.         Behavior: Behavior normal.         ASSESSMENT and PLAN    ICD-10-CM ICD-9-CM    1. Medicare annual wellness visit, subsequent  Z00.00 V70.0       2. Essential hypertension  I10 401.9       3. Acquired hypothyroidism  E03.9 244.9       4. Chronic obstructive pulmonary disease, unspecified COPD type ([facility]  J44.9 496       5. Dementia without behavioral disturbance (HCC)  F03.90 294.20       6. Anxiety and depression  F41.9 300.00     F32. A 311       7. Pulsatile tinnitus of left ear  H93. A2 388.30 Assessment:  Hypertension well-controlled  Euthyroid with current dose of levothyroxine  COPD symptomatically stable  Dementia  Symptoms of anxiety and depression adequately controlled  History of pulsatile tinnitus of the left ear thought to be secondary to a carotid sheath tumor followed by otolaryngology with MRI from 2023 showin. Stable to minimally larger enhancing mass in the lateral left carotid space. Displacement of the vessels suggests vagus nerve origin, could be vagal nerve sheath tumor or vagal paraganglioma. 2. Questionable abnormal signal of the left optic nerve which could certainly be artifactual secondary to motion degradation. No appreciable enhancement. Correlate clinically as an optic neuropathy cannot be excluded. Plan:  Try Xyzal to see if that helps with allergy symptoms better than Zyrtec, Allegra or Claritin  Continue other current medications  Continue otolaryngology follow-up  Return for annual physical exam and follow-up in 1 year or sooner with any problems  Please always arrive at least 15 minutes before your scheduled appointment time          Date of Service:  2023   Patient Name:  Sammy Kelly   Patient :  1935     This is a Subsequent Medicare Annual Wellness Visit providing Personalized Prevention Plan Services (PPPS) (Performed 12 months after initial AWV and PPPS )     I have reviewed the patient's medical history in detail and updated the computerized patient record.      History     Past Medical History:   Diagnosis Date    COPD (chronic obstructive pulmonary disease) (HCC)     Depression     Hypertension     Hypertension     Shortness of breath     Thyroid disease     Vertigo       Past Surgical History:   Procedure Laterality Date    HX BREAST BIOPSY      HX CATARACT REMOVAL      HX HYSTERECTOMY      HX KNEE REPLACEMENT Left 2016     Current Outpatient Medications   Medication Sig Dispense Refill    levothyroxine (SYNTHROID) 75 mcg tablet TAKE 1 TABLET BY MOUTH DAILY BEFORE BREAKFAST 90 Tablet 0    hydrALAZINE (APRESOLINE) 25 mg tablet TAKE 1 TABLET BY MOUTH THREE TIMES DAILY 270 Tablet 1    dilTIAZem ER (CARDIZEM CD) 240 mg capsule TAKE 1 CAPSULE BY MOUTH DAILY 90 Capsule 1    losartan (COZAAR) 100 mg tablet TAKE 1 TABLET BY MOUTH DAILY 90 Tablet 1    furosemide (LASIX) 20 mg tablet TAKE 1 TABLET BY MOUTH DAILY 90 Tablet 1    PARoxetine (PAXIL) 10 mg tablet Take 10 mg by mouth daily. donepeziL (ARICEPT) 10 mg tablet Take 10 mg by mouth daily. fexofenadine (ALLEGRA) 180 mg tablet Take  by mouth.      polyethylene glycol (MIRALAX) 17 gram/dose powder Mix 1 capful or 17 g in any 8 ounce beverage and drink daily. If constipation continues increase to twice daily 289 g 5    tetrahydrozoline HCl/zinc sulf (EYE DROPS ALLERGY RELIEF OP) Apply 1 Drop to eye two (2) times daily as needed. diclofenac (VOLTAREN) 1 % gel Apply  to affected area four (4) times daily. aspirin 81 mg chewable tablet Take 81 mg by mouth. Allergies   Allergen Reactions    Pcn [Penicillins] Rash     Family History   Problem Relation Age of Onset    Hypertension Mother     Hypertension Father     Breast Cancer Child      Social History     Tobacco Use    Smoking status: Never    Smokeless tobacco: Never   Substance Use Topics    Alcohol use: Yes     Patient Active Problem List   Diagnosis Code    Essential hypertension I10    Acquired hypothyroidism E03.9    Anxiety and depression F41.9, F32. A    Dementia without behavioral disturbance (HCC) F03.90    Chronic obstructive pulmonary disease (HCC) J44.9    Pulsatile tinnitus of left ear H93. A2       Living situation:   -- Lives with family  -- [place] [employer] in home yes    Diet, Lifestyle: nonsmoker    Exercise level: moderately active    Depression Risk Factor Screening:     3 most recent PHQ Screens 1/15/2023   PHQ Not Done -   Little interest or pleasure in doing things Not at all   Feeling down, depressed, irritable, or hopeless Not at all   Total Score PHQ 2 0   Trouble falling or staying asleep, or sleeping too much -   Feeling tired or having little energy -   Poor appetite, weight loss, or overeating -   Feeling bad about yourself - or that you are a failure or have let yourself or your family down -   Trouble concentrating on things such as school, work, reading, or watching TV -   Moving or speaking so slowly that other people could have noticed; or the opposite being so fidgety that others notice -   Thoughts of being better off dead, or hurting yourself in some way -   PHQ 9 Score -   How difficult have these problems made it for you to do your work, take care of your home and get along with others -     Alcohol Risk Factor Screening:   Do you average more than 1 drink per night or more than 7 drinks a week:  No    On any one occasion in the past three months have you have had more than 3 drinks containing alcohol:  No    Functional Ability and Level of Safety:     Hearing Loss   Notices decreased hearing left ear    Activities of Daily Living   Self-care. Requires assistance with: no ADLs    Fall Risk   1 fall 2 months ago  Fall Risk Assessment, last 12 mths 8/30/2022   Able to walk? Yes   Fall in past 12 months? 0   Do you feel unsteady? 0   Are you worried about falling 0   Is the gait abnormal? -   Number of falls in past 12 months -   Fall with injury? -     Abuse Screen   Patient is not abused    Review of Systems   Review of systems reported in the separate problem-oriented documentation. Physical Examination   Physical exam reported in the separate problem-oriented documentation.     Evaluation of Cognitive Function:  Memory problems and other symptoms of cognitive dysfunction with minimal progression  Mood/affect:  neutral  Appearance: age appropriate  Family member/caregiver input: Son    Patient Care Team:  Gigi Lovett MD as PCP - General (Family Medicine)  Gigi Lovett MD as PCP - [facility] Provider    Advice/Counseling   Education and counseling provided:  Advance directives review  Risks associated with narcotic medication and substances reviewed    Assessment/Plan       ICD-10-CM ICD-9-CM    1. Medicare annual wellness visit, subsequent  Z00.00 V70.0       2. Essential hypertension  I10 401.9       3. Acquired hypothyroidism  E03.9 244.9       4. Chronic obstructive pulmonary disease, unspecified COPD type ([facility]  J44.9 496       5. Dementia without behavioral disturbance (HCC)  F03.90 294.20       6. Anxiety and depression  F41.9 300.00     F32. A 311       7. Pulsatile tinnitus of left ear  H93. A2 388.30         Health maintenance:    5-year personalized preventative services plan/recommendations:  Complete and return advance directives form  Consider the Shingrix immunization series to reduce the risk of shingles, available at the pharmacy  COVID-19 immunization with bivalent vaccine  Influenza immunization completed  Medicare wellness evaluation due January 2024        Felecia Tracey was provided a written 5-year personalized preventive services plan, which was included in her AVS.    Opal Tamayo MD      PLEASE NOTE:   This document has been produced using voice recognition software. Unrecognized errors in transcription may be present.

## 2023-01-17 ENCOUNTER — OFFICE VISIT (OUTPATIENT)
Dept: FAMILY MEDICINE CLINIC | Age: 88
End: 2023-01-17
Payer: MEDICARE

## 2023-01-17 VITALS
DIASTOLIC BLOOD PRESSURE: 74 MMHG | TEMPERATURE: 97.6 F | RESPIRATION RATE: 16 BRPM | OXYGEN SATURATION: 97 % | SYSTOLIC BLOOD PRESSURE: 138 MMHG | BODY MASS INDEX: 25.76 KG/M2 | HEART RATE: 55 BPM | WEIGHT: 140 LBS | HEIGHT: 62 IN

## 2023-01-17 DIAGNOSIS — F03.90 DEMENTIA WITHOUT BEHAVIORAL DISTURBANCE (HCC): ICD-10-CM

## 2023-01-17 DIAGNOSIS — Z00.00 MEDICARE ANNUAL WELLNESS VISIT, SUBSEQUENT: Primary | ICD-10-CM

## 2023-01-17 DIAGNOSIS — F41.9 ANXIETY AND DEPRESSION: ICD-10-CM

## 2023-01-17 DIAGNOSIS — J44.9 CHRONIC OBSTRUCTIVE PULMONARY DISEASE, UNSPECIFIED COPD TYPE (HCC): ICD-10-CM

## 2023-01-17 DIAGNOSIS — H93.A2 PULSATILE TINNITUS OF LEFT EAR: ICD-10-CM

## 2023-01-17 DIAGNOSIS — E03.9 ACQUIRED HYPOTHYROIDISM: ICD-10-CM

## 2023-01-17 DIAGNOSIS — I10 ESSENTIAL HYPERTENSION: ICD-10-CM

## 2023-01-17 DIAGNOSIS — F32.A ANXIETY AND DEPRESSION: ICD-10-CM

## 2023-01-17 PROCEDURE — 1123F ACP DISCUSS/DSCN MKR DOCD: CPT | Performed by: FAMILY MEDICINE

## 2023-01-17 PROCEDURE — 1101F PT FALLS ASSESS-DOCD LE1/YR: CPT | Performed by: FAMILY MEDICINE

## 2023-01-17 PROCEDURE — G8536 NO DOC ELDER MAL SCRN: HCPCS | Performed by: FAMILY MEDICINE

## 2023-01-17 PROCEDURE — G9717 DOC PT DX DEP/BP F/U NT REQ: HCPCS | Performed by: FAMILY MEDICINE

## 2023-01-17 PROCEDURE — G0439 PPPS, SUBSEQ VISIT: HCPCS | Performed by: FAMILY MEDICINE

## 2023-01-17 PROCEDURE — G8417 CALC BMI ABV UP PARAM F/U: HCPCS | Performed by: FAMILY MEDICINE

## 2023-01-17 PROCEDURE — G8427 DOCREV CUR MEDS BY ELIG CLIN: HCPCS | Performed by: FAMILY MEDICINE

## 2023-01-17 NOTE — PATIENT INSTRUCTIONS
5-year personalized preventative services plan/recommendations:  Complete and return advance directives form  Consider the Shingrix immunization series to reduce the risk of shingles, available at the pharmacy  COVID-19 immunization with bivalent vaccine  Influenza immunization completed  Medicare wellness evaluation due 2024    Assessment:  Hypertension well-controlled  Euthyroid with current dose of levothyroxine  COPD symptomatically stable  Dementia  Symptoms of anxiety and depression adequately controlled  History of pulsatile tinnitus of the left ear thought to be secondary to a carotid sheath tumor followed by otolaryngology with MRI from 2023 showin. Stable to minimally larger enhancing mass in the lateral left carotid space. Displacement of the vessels suggests vagus nerve origin, could be vagal nerve sheath tumor or vagal paraganglioma. 2. Questionable abnormal signal of the left optic nerve which could certainly be artifactual secondary to motion degradation. No appreciable enhancement. Correlate clinically as an optic neuropathy cannot be excluded.     Plan:  Try Xyzal to see if that helps with allergy symptoms better than Zyrtec, Allegra or Claritin  Continue other current medications  Continue otolaryngology follow-up  Return for annual physical exam and follow-up in 1 year or sooner with any problems  Please always arrive at least 15 minutes before your scheduled appointment time

## 2023-03-28 RX ORDER — LEVOTHYROXINE SODIUM 0.07 MG/1
TABLET ORAL
Qty: 90 TABLET | Refills: 3 | Status: SHIPPED | OUTPATIENT
Start: 2023-03-28

## 2023-03-28 RX ORDER — LOSARTAN POTASSIUM 100 MG/1
TABLET ORAL
Qty: 90 TABLET | Refills: 3 | Status: SHIPPED | OUTPATIENT
Start: 2023-03-28

## 2023-04-10 RX ORDER — FUROSEMIDE 20 MG/1
TABLET ORAL
Qty: 90 TABLET | Refills: 3 | Status: SHIPPED | OUTPATIENT
Start: 2023-04-10

## 2023-04-10 RX ORDER — HYDRALAZINE HYDROCHLORIDE 25 MG/1
TABLET, FILM COATED ORAL
Qty: 270 TABLET | Refills: 3 | Status: SHIPPED | OUTPATIENT
Start: 2023-04-10

## 2023-04-10 RX ORDER — DILTIAZEM HYDROCHLORIDE 240 MG/1
CAPSULE, COATED, EXTENDED RELEASE ORAL
Qty: 90 CAPSULE | Refills: 3 | Status: SHIPPED | OUTPATIENT
Start: 2023-04-10

## 2023-08-16 ENCOUNTER — TELEPHONE (OUTPATIENT)
Dept: FAMILY MEDICINE CLINIC | Facility: CLINIC | Age: 88
End: 2023-08-16

## 2023-08-16 NOTE — TELEPHONE ENCOUNTER
----- Message from [facility] sent at 8/16/2023  2:51 PM EDT -----  Subject: Message to Provider    QUESTIONS  Information for Provider? Pt wants to know when dr Joni Hashimoto wants to see her   again   ---------------------------------------------------------------------------  --------------  Isi Capps INFO  [phone]; OK to leave message on voicemail  ---------------------------------------------------------------------------  --------------  SCRIPT ANSWERS  Relationship to Patient?  Self

## 2023-08-17 NOTE — TELEPHONE ENCOUNTER
I called daughter patient would be due in January . During call I noticed that patient has [facility] which is out of network . Suggested she call Medicare and ask for a continuity of care or can call CMS at [phone] to change to a different HMO . Daughter voiced understanding and will keep Medicare wellness scheduled for now .

## 2023-09-07 DIAGNOSIS — Z91.81 AT MODERATE RISK FOR FALL: Primary | ICD-10-CM

## 2023-09-07 DIAGNOSIS — M62.81 MUSCLE WEAKNESS (GENERALIZED): ICD-10-CM

## 2023-10-16 ENCOUNTER — OFFICE VISIT (OUTPATIENT)
Dept: FAMILY MEDICINE CLINIC | Facility: CLINIC | Age: 88
End: 2023-10-16
Payer: MEDICARE

## 2023-10-16 VITALS
HEIGHT: 62 IN | WEIGHT: 133 LBS | HEART RATE: 65 BPM | SYSTOLIC BLOOD PRESSURE: 130 MMHG | TEMPERATURE: 98.1 F | OXYGEN SATURATION: 97 % | DIASTOLIC BLOOD PRESSURE: 80 MMHG | BODY MASS INDEX: 24.48 KG/M2 | RESPIRATION RATE: 16 BRPM

## 2023-10-16 DIAGNOSIS — Z23 NEED FOR PROPHYLACTIC VACCINATION AND INOCULATION AGAINST INFLUENZA: ICD-10-CM

## 2023-10-16 DIAGNOSIS — Z87.440 HISTORY OF UTI: Primary | ICD-10-CM

## 2023-10-16 PROCEDURE — G0008 ADMIN INFLUENZA VIRUS VAC: HCPCS | Performed by: FAMILY MEDICINE

## 2023-10-16 PROCEDURE — 99213 OFFICE O/P EST LOW 20 MIN: CPT | Performed by: FAMILY MEDICINE

## 2023-10-16 PROCEDURE — 1123F ACP DISCUSS/DSCN MKR DOCD: CPT | Performed by: FAMILY MEDICINE

## 2023-10-16 PROCEDURE — 90694 VACC AIIV4 NO PRSRV 0.5ML IM: CPT | Performed by: FAMILY MEDICINE

## 2023-10-16 RX ORDER — CEPHALEXIN 500 MG/1
500 CAPSULE ORAL 2 TIMES DAILY
COMMUNITY
Start: 2023-10-12

## 2023-10-16 SDOH — ECONOMIC STABILITY: FOOD INSECURITY: WITHIN THE PAST 12 MONTHS, THE FOOD YOU BOUGHT JUST DIDN'T LAST AND YOU DIDN'T HAVE MONEY TO GET MORE.: NEVER TRUE

## 2023-10-16 SDOH — ECONOMIC STABILITY: HOUSING INSECURITY
IN THE LAST 12 MONTHS, WAS THERE A TIME WHEN YOU DID NOT HAVE A STEADY PLACE TO SLEEP OR SLEPT IN A SHELTER (INCLUDING NOW)?: NO

## 2023-10-16 SDOH — ECONOMIC STABILITY: FOOD INSECURITY: WITHIN THE PAST 12 MONTHS, YOU WORRIED THAT YOUR FOOD WOULD RUN OUT BEFORE YOU GOT MONEY TO BUY MORE.: NEVER TRUE

## 2023-10-16 SDOH — ECONOMIC STABILITY: INCOME INSECURITY: HOW HARD IS IT FOR YOU TO PAY FOR THE VERY BASICS LIKE FOOD, HOUSING, MEDICAL CARE, AND HEATING?: NOT HARD AT ALL

## 2023-10-16 ASSESSMENT — ENCOUNTER SYMPTOMS
ABDOMINAL PAIN: 0
SHORTNESS OF BREATH: 0

## 2023-10-16 NOTE — PATIENT INSTRUCTIONS
Current Status:  Recent episode of lethargy and change in mental status resulting in an emergency department evaluation and treatment for UTI    Health Maintenance Recommendations:  Keep current with COVID-19 immunization guidelines  Influenza immunization    Plan:  Continue current medications  Complete current dose of antibiotics  Return for previously scheduled Medicare wellness evaluation on 1/22/2024  Return sooner with any problems  Please always arrive at least 15 minutes before your scheduled appointment time.

## 2023-10-16 NOTE — PROGRESS NOTES
HISTORY OF PRESENT ILLNESS  Latonia Rosario  is a 80 y.o. y.o. female    Presents for follow-up after evaluation at the [facility] emergency department on 10/12/2023 with new onset of lethargy and change in mental status. Emergency room evaluation including lab and head CT were remarkable only for urinalysis suggestive of a UTI. The patient's symptoms essentially resolved during her emergency room evaluation. She was started on cephalexin with urine culture pending.         Mr#: [id number]      Past Medical History:   Diagnosis Date    COPD (chronic obstructive pulmonary disease) ([address])     Depression     Hypertension     Hypertension     Shortness of breath     Thyroid disease     Vertigo        Past Surgical History:   Procedure Laterality Date    BREAST BIOPSY      CATARACT REMOVAL      HYSTERECTOMY (CERVIX STATUS UNKNOWN)      TOTAL KNEE ARTHROPLASTY Left 2016       Family History   Problem Relation Age of Onset    Breast Cancer Child     Hypertension Father     Hypertension Mother        Allergies   Allergen Reactions    Penicillins Rash       Social History     Tobacco Use   Smoking Status Never   Smokeless Tobacco Never       Social History     Substance and Sexual Activity   Alcohol Use Yes       Immunization History   Administered Date(s) Administered    COVID-19, MODERNA BLUE border, Primary or Immunocompromised, (age 12y+), IM, 100 mcg/0.5mL 01/22/2021, 02/16/2021, 11/19/2021    Influenza Trivalent 09/13/2011, 09/15/2016    Influenza Virus Vaccine 09/13/2011, 09/10/2019, 11/19/2020    Influenza, FLUAD, (age 72 y+), Adjuvanted, 0.5mL 10/25/2021    Pneumococcal, PCV-13, PREVNAR 15, (age 6w+), IM, 0.5mL 06/22/2020    Pneumococcal, PPSV23, PNEUMOVAX 21, (age 2y+), SC/IM, 0.5mL 09/16/2016    TDaP, ADACEL (age 6y-58y), [facility] (age 10y+), IM, 0.5mL 03/26/2019    Zoster Live (Zostavax) 05/11/2011    Zoster Recombinant (Shingrix) 05/11/2011       Patient Active Problem List   Diagnosis    Essential

## 2023-12-28 NOTE — TELEPHONE ENCOUNTER
Last refilled Levothyroxine,Losartan  last refilled 3/28/23 for 90 with 3 refills. Hydralazine 4/10/23 for 270 with 3 refills.  Last ov 10/16/23   Future Appointments   Date Time Provider [address]   1/22/2024  3:00 PM MD AMAN Aguero BS AMB

## 2023-12-29 RX ORDER — LEVOTHYROXINE SODIUM 0.07 MG/1
75 TABLET ORAL
Qty: 90 TABLET | Refills: 3 | Status: SHIPPED | OUTPATIENT
Start: 2023-12-29

## 2023-12-29 RX ORDER — HYDRALAZINE HYDROCHLORIDE 25 MG/1
TABLET, FILM COATED ORAL
Qty: 270 TABLET | Refills: 3 | Status: SHIPPED | OUTPATIENT
Start: 2023-12-29

## 2023-12-29 RX ORDER — LOSARTAN POTASSIUM 100 MG/1
TABLET ORAL
Qty: 90 TABLET | Refills: 3 | Status: SHIPPED | OUTPATIENT
Start: 2023-12-29

## 2024-01-16 RX ORDER — DILTIAZEM HYDROCHLORIDE 240 MG/1
CAPSULE, COATED, EXTENDED RELEASE ORAL
Qty: 90 CAPSULE | Refills: 3 | Status: SHIPPED | OUTPATIENT
Start: 2024-01-16

## 2024-01-16 NOTE — TELEPHONE ENCOUNTER
Last refilled 4/10/23 for 90 with 3 refills . Last ov 10/16/23   Future Appointments   Date Time Provider Department Center   1/22/2024  3:00 PM Polo Issa MD [facility] BS AMB

## 2024-01-19 SDOH — HEALTH STABILITY: PHYSICAL HEALTH: ON AVERAGE, HOW MANY DAYS PER WEEK DO YOU ENGAGE IN MODERATE TO STRENUOUS EXERCISE (LIKE A BRISK WALK)?: 0 DAYS

## 2024-01-19 SDOH — ECONOMIC STABILITY: FOOD INSECURITY: WITHIN THE PAST 12 MONTHS, THE FOOD YOU BOUGHT JUST DIDN'T LAST AND YOU DIDN'T HAVE MONEY TO GET MORE.: NEVER TRUE

## 2024-01-19 SDOH — ECONOMIC STABILITY: TRANSPORTATION INSECURITY
IN THE PAST 12 MONTHS, HAS LACK OF TRANSPORTATION KEPT YOU FROM MEETINGS, WORK, OR FROM GETTING THINGS NEEDED FOR DAILY LIVING?: NO

## 2024-01-19 SDOH — ECONOMIC STABILITY: FOOD INSECURITY: WITHIN THE PAST 12 MONTHS, YOU WORRIED THAT YOUR FOOD WOULD RUN OUT BEFORE YOU GOT MONEY TO BUY MORE.: NEVER TRUE

## 2024-01-19 SDOH — ECONOMIC STABILITY: INCOME INSECURITY: HOW HARD IS IT FOR YOU TO PAY FOR THE VERY BASICS LIKE FOOD, HOUSING, MEDICAL CARE, AND HEATING?: NOT HARD AT ALL

## 2024-01-19 ASSESSMENT — PATIENT HEALTH QUESTIONNAIRE - PHQ9
9. THOUGHTS THAT YOU WOULD BE BETTER OFF DEAD, OR OF HURTING YOURSELF: 0
5. POOR APPETITE OR OVEREATING: 0
SUM OF ALL RESPONSES TO PHQ QUESTIONS 1-9: 0
1. LITTLE INTEREST OR PLEASURE IN DOING THINGS: 0
4. FEELING TIRED OR HAVING LITTLE ENERGY: 0
2. FEELING DOWN, DEPRESSED OR HOPELESS: 0
SUM OF ALL RESPONSES TO PHQ QUESTIONS 1-9: 0
3. TROUBLE FALLING OR STAYING ASLEEP: 0
SUM OF ALL RESPONSES TO PHQ QUESTIONS 1-9: 0
7. TROUBLE CONCENTRATING ON THINGS, SUCH AS READING THE NEWSPAPER OR WATCHING TELEVISION: 0
SUM OF ALL RESPONSES TO PHQ9 QUESTIONS 1 & 2: 0
10. IF YOU CHECKED OFF ANY PROBLEMS, HOW DIFFICULT HAVE THESE PROBLEMS MADE IT FOR YOU TO DO YOUR WORK, TAKE CARE OF THINGS AT HOME, OR GET ALONG WITH OTHER PEOPLE: 0
SUM OF ALL RESPONSES TO PHQ QUESTIONS 1-9: 0
6. FEELING BAD ABOUT YOURSELF - OR THAT YOU ARE A FAILURE OR HAVE LET YOURSELF OR YOUR FAMILY DOWN: 0
8. MOVING OR SPEAKING SO SLOWLY THAT OTHER PEOPLE COULD HAVE NOTICED. OR THE OPPOSITE, BEING SO FIGETY OR RESTLESS THAT YOU HAVE BEEN MOVING AROUND A LOT MORE THAN USUAL: 0

## 2024-01-19 ASSESSMENT — LIFESTYLE VARIABLES
HOW MANY STANDARD DRINKS CONTAINING ALCOHOL DO YOU HAVE ON A TYPICAL DAY: 1
HOW OFTEN DO YOU HAVE SIX OR MORE DRINKS ON ONE OCCASION: 1
HOW OFTEN DO YOU HAVE A DRINK CONTAINING ALCOHOL: 2-3 TIMES A WEEK
HOW MANY STANDARD DRINKS CONTAINING ALCOHOL DO YOU HAVE ON A TYPICAL DAY: 1 OR 2
HOW OFTEN DO YOU HAVE A DRINK CONTAINING ALCOHOL: 4

## 2024-01-19 NOTE — PROGRESS NOTES
HISTORY OF PRESENT ILLNESS  Guera Osorio  is a 88 y.o. y.o. 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        Mr#: [id number]      Past Medical History:   Diagnosis Date    COPD (chronic obstructive pulmonary disease) (HCC)     Depression     Hypertension     Hypertension     Shortness of breath     Thyroid disease     Vertigo        Past Surgical History:   Procedure Laterality Date    BREAST BIOPSY      CATARACT REMOVAL      HYSTERECTOMY (CERVIX STATUS UNKNOWN)      TOTAL KNEE ARTHROPLASTY Left 2016       Family History   Problem Relation Age of Onset    Breast Cancer Child     Hypertension Father     Hypertension Mother        Allergies   Allergen Reactions    Penicillins Rash       Social History     Tobacco Use   Smoking Status Never   Smokeless Tobacco Never       Social History     Substance and Sexual Activity   Alcohol Use Yes       Immunization History   Administered Date(s) Administered    COVID-19, MODERNA BLUE border, Primary or Immunocompromised, (age 12y+), IM, 100 mcg/0.5mL 01/22/2021, 02/16/2021, 11/19/2021    COVID-19, MODERNA Bivalent, (age 12y+), IM, 50 mcg/0.5 mL 12/03/2022    Influenza Trivalent 09/13/2011, 09/15/2016    Influenza Virus Vaccine 09/13/2011, 09/10/2019, 11/19/2020    Influenza, FLUAD, (age 65 y+), Adjuvanted, 0.5mL 10/25/2021, 10/16/2023    Pneumococcal, PCV-13, PREVNAR 13, (age 6w+), IM, 0.5mL 06/22/2020    Pneumococcal, PPSV23, PNEUMOVAX 23, (age 2y+), SC/IM, 0.5mL 09/16/2016    TDaP, ADACEL (age 10y-64y), BOOSTRIX (age 10y+), IM, 0.5mL 03/26/2019    Zoster Live (Zostavax) 05/11/2011    Zoster Recombinant (Shingrix) 05/11/2011       Patient Active Problem List   Diagnosis    Essential hypertension

## 2024-01-21 DIAGNOSIS — I10 ESSENTIAL HYPERTENSION: Primary | ICD-10-CM

## 2024-01-21 DIAGNOSIS — N18.32 STAGE 3B CHRONIC KIDNEY DISEASE (CKD) (HCC): ICD-10-CM

## 2024-01-21 DIAGNOSIS — E03.9 ACQUIRED HYPOTHYROIDISM: ICD-10-CM

## 2024-01-22 ENCOUNTER — OFFICE VISIT (OUTPATIENT)
Dept: FAMILY MEDICINE CLINIC | Facility: CLINIC | Age: 89
End: 2024-01-22
Payer: MEDICARE

## 2024-01-22 ENCOUNTER — NURSE ONLY (OUTPATIENT)
Dept: FAMILY MEDICINE CLINIC | Facility: CLINIC | Age: 88
End: 2024-01-22

## 2024-01-22 VITALS
BODY MASS INDEX: 23.19 KG/M2 | SYSTOLIC BLOOD PRESSURE: 120 MMHG | RESPIRATION RATE: 16 BRPM | WEIGHT: 126 LBS | HEIGHT: 62 IN | OXYGEN SATURATION: 98 % | HEART RATE: 68 BPM | TEMPERATURE: 97.8 F | DIASTOLIC BLOOD PRESSURE: 62 MMHG

## 2024-01-22 DIAGNOSIS — E03.9 ACQUIRED HYPOTHYROIDISM: ICD-10-CM

## 2024-01-22 DIAGNOSIS — N18.32 STAGE 3B CHRONIC KIDNEY DISEASE (CKD) (HCC): ICD-10-CM

## 2024-01-22 DIAGNOSIS — F32.A ANXIETY AND DEPRESSION: ICD-10-CM

## 2024-01-22 DIAGNOSIS — F41.9 ANXIETY AND DEPRESSION: ICD-10-CM

## 2024-01-22 DIAGNOSIS — F03.90 DEMENTIA WITHOUT BEHAVIORAL DISTURBANCE (HCC): ICD-10-CM

## 2024-01-22 DIAGNOSIS — I10 ESSENTIAL HYPERTENSION: ICD-10-CM

## 2024-01-22 DIAGNOSIS — I10 ESSENTIAL HYPERTENSION: Primary | ICD-10-CM

## 2024-01-22 DIAGNOSIS — Z00.00 MEDICARE ANNUAL WELLNESS VISIT, SUBSEQUENT: ICD-10-CM

## 2024-01-22 DIAGNOSIS — H93.A2 PULSATILE TINNITUS OF LEFT EAR: ICD-10-CM

## 2024-01-22 DIAGNOSIS — J44.9 CHRONIC OBSTRUCTIVE PULMONARY DISEASE, UNSPECIFIED COPD TYPE (HCC): ICD-10-CM

## 2024-01-22 PROCEDURE — 1123F ACP DISCUSS/DSCN MKR DOCD: CPT | Performed by: FAMILY MEDICINE

## 2024-01-22 PROCEDURE — G0439 PPPS, SUBSEQ VISIT: HCPCS | Performed by: FAMILY MEDICINE

## 2024-01-22 RX ORDER — MULTIVIT-MIN/FA/LYCOPEN/LUTEIN .4-300-25
1 TABLET ORAL DAILY
COMMUNITY

## 2024-01-22 NOTE — PATIENT INSTRUCTIONS
you have any problems.  Where can you learn more?  Go to https://[email or web address].net/patientEd and enter F075 to learn more about \"A Healthy Heart: Care Instructions.\"  Current as of: June 25, 2023               Content Version: 13.9  © 1449-8751 [employer].   Care instructions adapted under license by [employer]. If you have questions about a medical condition or this instruction, always ask your healthcare professional. [employer] disclaims any warranty or liability for your use of this information.      Personalized Preventive Plan for Guera Osorio - 1/22/2024  Medicare offers a range of preventive health benefits. Some of the tests and screenings are paid in full while other may be subject to a deductible, co-insurance, and/or copay.    Some of these benefits include a comprehensive review of your medical history including lifestyle, illnesses that may run in your family, and various assessments and screenings as appropriate.    After reviewing your medical record and screening and assessments performed today your provider may have ordered immunizations, labs, imaging, and/or referrals for you.  A list of these orders (if applicable) as well as your Preventive Care list are included within your After Visit Summary for your review.    Other Preventive Recommendations:    A preventive eye exam performed by an eye specialist is recommended every 1-2 years to screen for glaucoma; cataracts, macular degeneration, and other eye disorders.  A preventive dental visit is recommended every 6 months.  Try to get at least 150 minutes of exercise per week or 10,000 steps per day on a pedometer .  Order or download the FREE \"Exercise & Physical Activity: Your Everyday Guide\" from [employer]. Call 1-172.502.9175 or search [employer] online.  You need 4043-5412 mg of calcium and 6606-6178 IU of vitamin D per day. It is possible to meet your calcium

## 2024-01-22 NOTE — PROGRESS NOTES
Guera Osorio is a 88 y.o. female (: 1935) presenting to address:    Chief Complaint   Patient presents with    Medicare AWV       Vitals:    24 1530   BP: 120/62   Pulse: 68   Resp: 16   Temp: 97.8 °F (36.6 °C)   SpO2: 98%       Coordination of Care Questionaire:   1. \"Have you been to the ER, urgent care clinic since your last visit?  Hospitalized since your last visit?\" No    2. \"Have you seen or consulted any other health care providers outside of the [facility] since your last visit?\" Yes      3. For patients aged 45-75: Has the patient had a colonoscopy / FIT/ Cologuard? NA - based on age      If the patient is female:    4. For patients aged 40-74: Has the patient had a mammogram within the past 2 years? NA - based on age or sex      5. For patients aged 21-65: Has the patient had a pap smear? NA - based on age or sex    Advanced Directive:   1. Do you have an Advanced Directive? Yes    2. Would you like information on Advanced Directives? No

## 2024-01-23 LAB
ALBUMIN SERPL-MCNC: 4.2 G/DL (ref 3.7–4.7)
ALBUMIN/GLOB SERPL: 1.6 {RATIO} (ref 1.2–2.2)
ALP SERPL-CCNC: 64 IU/L (ref 44–121)
ALT SERPL-CCNC: 6 IU/L (ref 0–32)
APPEARANCE UR: CLEAR
AST SERPL-CCNC: 11 IU/L (ref 0–40)
BASOPHILS # BLD AUTO: 0 X10E3/UL (ref 0–0.2)
BASOPHILS NFR BLD AUTO: 0 %
BILIRUB SERPL-MCNC: 0.3 MG/DL (ref 0–1.2)
BILIRUB UR QL STRIP: NEGATIVE
BUN SERPL-MCNC: 12 MG/DL (ref 8–27)
BUN/CREAT SERPL: 9 (ref 12–28)
CALCIUM SERPL-MCNC: 10.3 MG/DL (ref 8.7–10.3)
CHLORIDE SERPL-SCNC: 105 MMOL/L (ref 96–106)
CO2 SERPL-SCNC: 25 MMOL/L (ref 20–29)
COLOR UR: YELLOW
CREAT SERPL-MCNC: 1.27 MG/DL (ref 0.57–1)
EGFRCR SERPLBLD CKD-EPI 2021: 41 ML/MIN/1.73
EOSINOPHIL # BLD AUTO: 0 X10E3/UL (ref 0–0.4)
EOSINOPHIL NFR BLD AUTO: 0 %
ERYTHROCYTE [DISTWIDTH] IN BLOOD BY AUTOMATED COUNT: 13 % (ref 11.7–15.4)
GLOBULIN SER CALC-MCNC: 2.6 G/DL (ref 1.5–4.5)
GLUCOSE SERPL-MCNC: 108 MG/DL (ref 70–99)
GLUCOSE UR QL STRIP: NEGATIVE
HCT VFR BLD AUTO: 34.7 % (ref 34–46.6)
HGB BLD-MCNC: 11.1 G/DL (ref 11.1–15.9)
HGB UR QL STRIP: NEGATIVE
IMM GRANULOCYTES # BLD AUTO: 0 X10E3/UL (ref 0–0.1)
IMM GRANULOCYTES NFR BLD AUTO: 0 %
KETONES UR QL STRIP: ABNORMAL
LEUKOCYTE ESTERASE UR QL STRIP: ABNORMAL
LYMPHOCYTES # BLD AUTO: 0.8 X10E3/UL (ref 0.7–3.1)
LYMPHOCYTES NFR BLD AUTO: 12 %
MCH RBC QN AUTO: 29.4 PG (ref 26.6–33)
MCHC RBC AUTO-ENTMCNC: 32 G/DL (ref 31.5–35.7)
MCV RBC AUTO: 92 FL (ref 79–97)
MONOCYTES # BLD AUTO: 0.4 X10E3/UL (ref 0.1–0.9)
MONOCYTES NFR BLD AUTO: 6 %
NEUTROPHILS # BLD AUTO: 5.5 X10E3/UL (ref 1.4–7)
NEUTROPHILS NFR BLD AUTO: 82 %
NITRITE UR QL STRIP: NEGATIVE
PH UR STRIP: 6.5 [PH] (ref 5–7.5)
PLATELET # BLD AUTO: 281 X10E3/UL (ref 150–450)
POTASSIUM SERPL-SCNC: 3.7 MMOL/L (ref 3.5–5.2)
PROT SERPL-MCNC: 6.8 G/DL (ref 6–8.5)
PROT UR QL STRIP: ABNORMAL
RBC # BLD AUTO: 3.77 X10E6/UL (ref 3.77–5.28)
SODIUM SERPL-SCNC: 145 MMOL/L (ref 134–144)
SP GR UR STRIP: 1.02 (ref 1–1.03)
T4 FREE SERPL-MCNC: 1.12 NG/DL (ref 0.82–1.77)
TSH SERPL DL<=0.005 MIU/L-ACNC: 1.2 UIU/ML (ref 0.45–4.5)
UROBILINOGEN UR STRIP-MCNC: 1 MG/DL (ref 0.2–1)
WBC # BLD AUTO: 6.7 X10E3/UL (ref 3.4–10.8)

## 2024-03-21 RX ORDER — FUROSEMIDE 20 MG/1
TABLET ORAL
Qty: 90 TABLET | Refills: 1 | Status: SHIPPED | OUTPATIENT
Start: 2024-03-21

## 2024-03-21 NOTE — TELEPHONE ENCOUNTER
Last refilled 4/10/23 for 90 with 3 refills. Last ov 1/22/24   Future Appointments   Date Time Provider Department Center   7/22/2024 10:40 AM [facility] AMB   7/22/2024 11:00 AM Polo Issa MD [facility] AMB

## 2024-04-22 ENCOUNTER — TELEPHONE (OUTPATIENT)
Dept: FAMILY MEDICINE CLINIC | Facility: CLINIC | Age: 89
End: 2024-04-22

## 2024-04-22 NOTE — TELEPHONE ENCOUNTER
I called Patient's daughter to discuss her mother. Daughter wants to have Dr Issa take patient off of the water pills and anything else that could be causing her to lose weight says looking at her she thinks she is about a 100 pounds , I told her this is concerning since she was her in January and was 126 lbs ,  daughter said well she hasn't checked her weight to confirm that she is 100 pound but she just looks like she has lost weight , she did say patient is eating less lately . Appointment made to discuss .

## 2024-04-23 NOTE — TELEPHONE ENCOUNTER
I cannot make decisions about adjusting the patient's medication based on this information.  It would be best if she schedule an appointment

## 2024-04-23 NOTE — TELEPHONE ENCOUNTER
Patient' daughter wants to know if she can not give her the furosemide and anything else that is making her Pee.

## 2024-04-24 NOTE — TELEPHONE ENCOUNTER
Confirmed w/pt's daughter all meds w/be discussed at upcoming appt on:   Future Appointments   Date Time Provider Department Center   4/29/2024  3:30 PM Polo Issa MD [facility] AMB   7/22/2024 10:40 AM [facility] AM[facility]   7/22/2024 11:00 AM Polo Issa MD [facility] AMB

## 2024-04-28 NOTE — PROGRESS NOTES
HISTORY OF PRESENT ILLNESS  Guera Osorio  is a 88 y.o. y.o. female    Ms. 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  Lab results obtained from her annual follow-up evaluation 3 months ago were remarkable only for modest progression of renal insufficiency.  Thyroid results were within normal limits on levothyroxine 75 mcg daily.  She has demonstrated a gradual weight loss over the past few years.  Her weight has decreased by 5 pounds in the past 3 months.        Mr#: [id number]      Past Medical History:   Diagnosis Date    COPD (chronic obstructive pulmonary disease) (HCC)     Depression     Hypertension     Hypertension     Shortness of breath     Thyroid disease     Vertigo        Past Surgical History:   Procedure Laterality Date    BREAST BIOPSY      CATARACT REMOVAL      HYSTERECTOMY (CERVIX STATUS UNKNOWN)      TOTAL KNEE ARTHROPLASTY Left 2016       Family History   Problem Relation Age of Onset    Breast Cancer Child     Hypertension Father     Hypertension Mother        Allergies   Allergen Reactions    Penicillins Rash       Social History     Tobacco Use   Smoking Status Never   Smokeless Tobacco Never       Social History     Substance and Sexual Activity   Alcohol Use Yes       Immunization History   Administered Date(s) Administered    COVID-19, MODERNA BLUE border, Primary or Immunocompromised, (age 12y+), IM, 100 mcg/0.5mL 01/22/2021, 02/16/2021, 11/19/2021    COVID-19, MODERNA Bivalent, (age 12y+), IM, 50 mcg/0.5 mL 12/03/2022    Influenza Trivalent 09/13/2011, 09/15/2016    Influenza Virus Vaccine 09/13/2011, 09/10/2019, 11/19/2020    Influenza, FLUAD, (age 65 y+), Adjuvanted, 0.5mL 10/25/2021, 10/16/2023    Pneumococcal,

## 2024-04-29 ENCOUNTER — OFFICE VISIT (OUTPATIENT)
Dept: FAMILY MEDICINE CLINIC | Facility: CLINIC | Age: 89
End: 2024-04-29

## 2024-04-29 VITALS
DIASTOLIC BLOOD PRESSURE: 70 MMHG | TEMPERATURE: 98.5 F | WEIGHT: 121 LBS | OXYGEN SATURATION: 97 % | SYSTOLIC BLOOD PRESSURE: 130 MMHG | BODY MASS INDEX: 22.26 KG/M2 | RESPIRATION RATE: 16 BRPM | HEART RATE: 64 BPM | HEIGHT: 62 IN

## 2024-04-29 DIAGNOSIS — R63.4 UNINTENTIONAL WEIGHT LOSS: ICD-10-CM

## 2024-04-29 DIAGNOSIS — N18.32 STAGE 3B CHRONIC KIDNEY DISEASE (CKD) (HCC): ICD-10-CM

## 2024-04-29 DIAGNOSIS — F03.90 DEMENTIA WITHOUT BEHAVIORAL DISTURBANCE (HCC): Primary | ICD-10-CM

## 2024-04-29 DIAGNOSIS — R62.7 FAILURE TO THRIVE IN ADULT: ICD-10-CM

## 2024-04-29 NOTE — PROGRESS NOTES
Guera Osorio is a 88 y.o. female (: 1935) presenting to address:    Chief Complaint   Patient presents with    Weight Loss     Daughter would like patient to be taken off medication that makes her Pee.        Vitals:    24 1539   BP: 130/70   Pulse: 64   Resp: 16   Temp: 98.5 °F (36.9 °C)   SpO2: 97%       \"Have you been to the ER, urgent care clinic since your last visit?  Hospitalized since your last visit?\"    NO    “Have you seen or consulted any other health care providers outside of [facility] since your last visit?”    NO

## 2024-04-29 NOTE — PATIENT INSTRUCTIONS
Current Status:  The patient is bothered by excessive urination and family is requesting discontinuation of furosemide  Progressive dementia with decreased appetite, failure to thrive, decreased mobility  Family requesting assistance with an attempt at rehabilitation  Topic of hospice broached but family does not indicate interest in that at this time      Plan:  Discontinue furosemide, watch carefully for signs of shortness of breath, lower extremity swelling  Home health referral for nursing evaluation and recommendations regarding any additional intervention, return for follow-up in July as scheduled or sooner with any problems  Lab and Medicare wellness evaluation due January 2025

## 2024-04-30 ENCOUNTER — HOME CARE VISIT (OUTPATIENT)
Age: 89
End: 2024-04-30

## 2024-04-30 ENCOUNTER — HOME HEALTH ADMISSION (OUTPATIENT)
Age: 89
End: 2024-04-30

## 2024-04-30 ENCOUNTER — HOME HEALTH ADMISSION (OUTPATIENT)
Age: 89
End: 2024-04-30
Payer: MEDICARE

## 2024-05-06 ENCOUNTER — HOME CARE VISIT (OUTPATIENT)
Age: 89
End: 2024-05-06
Payer: MEDICARE

## 2024-05-06 ENCOUNTER — HOME CARE VISIT (OUTPATIENT)
Age: 89
End: 2024-05-06

## 2024-05-06 ENCOUNTER — TELEPHONE (OUTPATIENT)
Dept: FAMILY MEDICINE CLINIC | Facility: CLINIC | Age: 89
End: 2024-05-06

## 2024-05-06 VITALS
SYSTOLIC BLOOD PRESSURE: 112 MMHG | TEMPERATURE: 97.6 F | RESPIRATION RATE: 16 BRPM | DIASTOLIC BLOOD PRESSURE: 62 MMHG | HEART RATE: 51 BPM | OXYGEN SATURATION: 98 %

## 2024-05-06 PROCEDURE — G0299 HHS/HOSPICE OF RN EA 15 MIN: HCPCS

## 2024-05-06 ASSESSMENT — ENCOUNTER SYMPTOMS: DYSPNEA ACTIVITY LEVEL: AFTER AMBULATING MORE THAN 20 FT

## 2024-05-06 NOTE — TELEPHONE ENCOUNTER
Beulah called stating that pt's daughter is requesting PT for pt. Beulah would like a verbal from provider. Beulah is aware that the provider will be out until tomorrow. Please advise

## 2024-05-06 NOTE — HOME HEALTH
Skilled services/Home bound verification:     Skilled Reason for admission/summary of clinical condition: Dementia and medication teaching. Daughter reported that dementia is been the same constant short term memory but now refusing to eat. refusing to wear dentures to eat. report a decline in weight and causing generalized weakness. would like PT hunter to see if can improve strength REX will request. called Dr Issa to see if she can be prescibed medication to stimulate appetite as reported that was told to not give boost/ensure etc due CKD can not have excessive protien. waiting on return call.  This patient is homebound for the following reasons Requires considerable and taxing effort to leave the home , Requires the assistance of 1 or more persons to leave the home , Only leaves the home for medical reasons or Tenriism services and are infrequent and of short duration for other reasons  and Decreased mental status requiring 24 hour supervision .    Caregiver: relative.  Caregiver assists with ADLS/meal prep/transportation/med management.Patient unable to ambulate safely  and caregiver assistance, caregiver demonstrated poor safety with patient and requires training.      Medications reconciled and all medications are available in the home this visit.      The following education was provided regarding medications: reviewed all medication bottles in home for frequency, side effects and precautions.verbalized understanding    Medications  are too early to assess effectiveness. at this time.      High risk medication teaching regarding anticoagulants, antiplatelets, antibiotics, antipsychotics, hypoglycemic agents, or opioid/narcotics performed (specify): see intervention tab.          Home health supplies by type and quantity ordered/delivered this visit include: NA    Patient education provided this visit to include: see intervention tab.            Home exercise program/Homework provided: cont to take all

## 2024-05-07 ENCOUNTER — HOME CARE VISIT (OUTPATIENT)
Age: 89
End: 2024-05-07
Payer: MEDICARE

## 2024-05-07 ENCOUNTER — TELEPHONE (OUTPATIENT)
Dept: FAMILY MEDICINE CLINIC | Facility: CLINIC | Age: 89
End: 2024-05-07

## 2024-05-07 DIAGNOSIS — R63.4 WEIGHT LOSS, NON-INTENTIONAL: ICD-10-CM

## 2024-05-07 DIAGNOSIS — R62.7 FAILURE TO THRIVE IN ADULT: Primary | ICD-10-CM

## 2024-05-07 RX ORDER — MEGESTROL ACETATE 20 MG/1
20 TABLET ORAL DAILY
Qty: 30 TABLET | Refills: 0 | Status: SHIPPED | OUTPATIENT
Start: 2024-05-07

## 2024-05-07 NOTE — TELEPHONE ENCOUNTER
Jet was speaking with daughter and she mentioned that patient is Fatigued, weak and  not eating wants to know if magace could be prescribed to simulate her appetite since she can't take things like Ensure because of her kidneys .

## 2024-05-07 NOTE — TELEPHONE ENCOUNTER
Beatriz from [facility] called on behalf of the pt. Beatriz would like a call back from Dr Rajat gregory

## 2024-05-08 ENCOUNTER — TELEPHONE (OUTPATIENT)
Dept: FAMILY MEDICINE CLINIC | Facility: CLINIC | Age: 89
End: 2024-05-08

## 2024-05-08 NOTE — TELEPHONE ENCOUNTER
I notified daughter she says patient is doing boost and didn't tell the nurse Dr Issa told her to avoid these . I will give a call once the insurance replies. She voiced understanding .

## 2024-05-14 ENCOUNTER — HOME CARE VISIT (OUTPATIENT)
Age: 89
End: 2024-05-14
Payer: MEDICARE

## 2024-05-16 ENCOUNTER — HOME CARE VISIT (OUTPATIENT)
Age: 89
End: 2024-05-16
Payer: MEDICARE

## 2024-05-16 VITALS — TEMPERATURE: 97.2 F | OXYGEN SATURATION: 98 % | HEART RATE: 62 BPM | RESPIRATION RATE: 16 BRPM

## 2024-05-16 PROCEDURE — G0151 HHCP-SERV OF PT,EA 15 MIN: HCPCS

## 2024-05-16 NOTE — HOME HEALTH
only wants to stay in bed due to constant pounding in her head and ears (Pulsatile tinnitus of left ear).  Pt's sylvia reports pt was just on a family visit to [place] over the weekend and was able to get out to a mother's day celebration but still spent most of the time in bed.  She states pt is being monitored by a doctor for the tinnitus.  She states she would like to know if pt could be transitioned to a nursing facility for rehab.  PT called Dr Issa's office to request a MSW referral.  Spoke to Karol who requested PT to send the order through in basket message.      No skilled PT need was identified as pt was very clear that she is not going to participate.  Pt's sylvia reports pt has all needed equipment and is mobile in the home with no recent falls. PT recommended pt/sylvia to be OOB and amb as often as pt will agree.  They verbalized understanding  PLAN: PT eval only    DISCHARGE PLANNING DISCUSSED: PT eval only

## 2024-05-17 ENCOUNTER — HOME CARE VISIT (OUTPATIENT)
Age: 89
End: 2024-05-17
Payer: MEDICARE

## 2024-05-17 VITALS
SYSTOLIC BLOOD PRESSURE: 120 MMHG | RESPIRATION RATE: 16 BRPM | HEART RATE: 57 BPM | DIASTOLIC BLOOD PRESSURE: 70 MMHG | OXYGEN SATURATION: 98 %

## 2024-05-17 PROCEDURE — G0300 HHS/HOSPICE OF LPN EA 15 MIN: HCPCS

## 2024-05-17 NOTE — HOME HEALTH
Skilled reason for visit: Education     Caregiver involvement: Due to Dx pt is dependent in care from family or CG    Medications reviewed and all medications are available in the home this visit.    The following education was provided regarding medications:  FAINA LAMAS notified of any discrepancies/look a-like medications/medication interactions: NA  Medications are effective  at this time.      Home health supplies by type and quantity ordered/delivered this visit include: Supplies available and will be ordered by HH if low     Patient education provided this visit: PT was alert at time of visit. SPouse was at bedside no concernsverbalzied from Pt or [employer]. Pt was in bed at time of visit.      Sharps education provided: FAINA    Patient level of understanding of education provided: Pt verblized all understanding of education provided     Patient response to procedure performed:  No pain     Agency Progress toward goals: Progressing towards all goals set for patient at start of service      Patient's Progress towards personal goals: Pt progressing towards all goals set      Home exercise program: NA    Continued need for the following skills: Nursing    Plan for next visit: education     Patient and/or caregiver notified and agrees to changes in the Plan of Care: yes      The following discharge planning was discussed with the pt/caregiver: Pt to be discharged to family or self once all HH goals met

## 2024-05-21 ENCOUNTER — TELEPHONE (OUTPATIENT)
Dept: FAMILY MEDICINE CLINIC | Facility: CLINIC | Age: 89
End: 2024-05-21

## 2024-05-21 ENCOUNTER — HOME CARE VISIT (OUTPATIENT)
Age: 89
End: 2024-05-21
Payer: MEDICARE

## 2024-05-21 VITALS
SYSTOLIC BLOOD PRESSURE: 120 MMHG | HEART RATE: 78 BPM | OXYGEN SATURATION: 97 % | DIASTOLIC BLOOD PRESSURE: 62 MMHG | RESPIRATION RATE: 16 BRPM | TEMPERATURE: 98.2 F

## 2024-05-21 PROCEDURE — G0299 HHS/HOSPICE OF RN EA 15 MIN: HCPCS

## 2024-05-21 ASSESSMENT — ENCOUNTER SYMPTOMS
STOOL DESCRIPTION: FORMED
HEMOPTYSIS: 0

## 2024-05-21 NOTE — TELEPHONE ENCOUNTER
Patients daughter called . Ms lucas has been taking the megace for a week and she hasn't noticed any difference in her appetite and patient has been complaining of a headache since she started taking the medication and would like for the patient to get off the medication and wanted to know if there are any special directions to stop or can she just stop it now .

## 2024-05-21 NOTE — HOME HEALTH
Skilled reason for visit: Assessment of patient vitals signs and comfort.  Review of medications and eduation on infection control, fall prevention    Caregiver involvement: Daughter is present for assessment and education and is involved in patient's care    Medications reviewed and all medications are available in the home this visit.    The following education was provided regarding medications:  proper indicatiosn and adminstration.    MD notified of any discrepancies/look a-like medications/medication interactions: na  Medications are effective at this time.      Home health supplies by type and quantity ordered/delivered this visit include: na    Patient education provided this visit: Educated patient and daughter on maintenance of skin integrity with frequent repositioning in bed to alleviate pressure.  Exam skin daily for signs of redness or skin breakdown.  Instricted on signs of Urinary Tract infections and to notify physician or HH agency if noted.  Educated on safety- Bed is very high.  Patient is to notify daughter for assistance in getting out of bed to prevent falls.  Clutter around bed to be removed for safe walkway.      Sharps education provided: na    Patient level of understanding of education provided- Patient and daughter verbalized understanding    Patient response to procedure performed:  patient was pleasant and responded well to assessment    Agency Progress toward goals: Patient is progressing through goals of education.  She is able to verbalize understanding of proper indications and administration of medication, good urinary hygiene and prevention of UTIs, Signs of UTI, safety when sitting up in bed and ambulating.     Patient's Progress towards personal goals: Patient is satisfied with progression of goals.t    Home exercise program: Patient moves arms, legs and changes position in bed every 1-2 hours per patient and daughter    Continued need for the following skills: Nursing and

## 2024-05-25 ENCOUNTER — HOME CARE VISIT (OUTPATIENT)
Age: 89
End: 2024-05-25
Payer: MEDICARE

## 2024-05-29 ENCOUNTER — HOME CARE VISIT (OUTPATIENT)
Age: 89
End: 2024-05-29
Payer: MEDICARE

## 2024-06-01 ENCOUNTER — HOME CARE VISIT (OUTPATIENT)
Age: 89
End: 2024-06-01
Payer: MEDICARE

## 2024-07-21 DIAGNOSIS — N18.32 STAGE 3B CHRONIC KIDNEY DISEASE (CKD) (HCC): Primary | ICD-10-CM

## 2024-08-09 NOTE — PROGRESS NOTES
HISTORY OF PRESENT ILLNESS  Guera Osorio  is a 88 y.o. y.o. 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  Family indicated increased difficulty providing the patient with adequate care at home.  A home health skilled nursing/ consultation was generated.   provided the family with information regarding potential for placement and residential care along with options for financial assistance with application for Medicaid recommended.    Today family reports that she feels extremely cold even when she is outside on a hot day.  They are concerned about her \"iron level\".  She and family acknowledged that her appetite is decreased and she seems to eat less and less.  There are no reported areas of distress.        Mr#: [id number]      Past Medical History:   Diagnosis Date    COPD (chronic obstructive pulmonary disease) (HCC)     Depression     Hypertension     Hypertension     Shortness of breath     Thyroid disease     Vertigo        Past Surgical History:   Procedure Laterality Date    BREAST BIOPSY      CATARACT REMOVAL      HYSTERECTOMY (CERVIX STATUS UNKNOWN)      TOTAL KNEE ARTHROPLASTY Left 2016       Family History   Problem Relation Age of Onset    Breast Cancer Child     Hypertension Father     Hypertension Mother        Allergies   Allergen Reactions    Penicillins Rash       Social History     Tobacco Use   Smoking Status Never   Smokeless Tobacco Never       Social History     Substance and Sexual Activity   Alcohol Use Yes       Immunization History   Administered Date(s) Administered    COVID-19, MODERNA BLUE border, Primary or Immunocompromised, (age 12y+), IM,

## 2024-08-12 ENCOUNTER — LAB (OUTPATIENT)
Dept: FAMILY MEDICINE CLINIC | Facility: CLINIC | Age: 89
End: 2024-08-12

## 2024-08-12 ENCOUNTER — OFFICE VISIT (OUTPATIENT)
Dept: FAMILY MEDICINE CLINIC | Facility: CLINIC | Age: 89
End: 2024-08-12
Payer: MEDICARE

## 2024-08-12 VITALS
WEIGHT: 112 LBS | RESPIRATION RATE: 16 BRPM | DIASTOLIC BLOOD PRESSURE: 60 MMHG | HEART RATE: 57 BPM | OXYGEN SATURATION: 97 % | TEMPERATURE: 98.2 F | HEIGHT: 62 IN | BODY MASS INDEX: 20.61 KG/M2 | SYSTOLIC BLOOD PRESSURE: 120 MMHG

## 2024-08-12 DIAGNOSIS — F41.9 ANXIETY AND DEPRESSION: ICD-10-CM

## 2024-08-12 DIAGNOSIS — N18.32 STAGE 3B CHRONIC KIDNEY DISEASE (CKD) (HCC): ICD-10-CM

## 2024-08-12 DIAGNOSIS — F32.A ANXIETY AND DEPRESSION: ICD-10-CM

## 2024-08-12 DIAGNOSIS — F03.90 DEMENTIA WITHOUT BEHAVIORAL DISTURBANCE (HCC): ICD-10-CM

## 2024-08-12 DIAGNOSIS — H93.A2 PULSATILE TINNITUS OF LEFT EAR: ICD-10-CM

## 2024-08-12 DIAGNOSIS — I10 ESSENTIAL HYPERTENSION: Primary | ICD-10-CM

## 2024-08-12 DIAGNOSIS — E03.9 ACQUIRED HYPOTHYROIDISM: ICD-10-CM

## 2024-08-12 PROCEDURE — 99213 OFFICE O/P EST LOW 20 MIN: CPT | Performed by: FAMILY MEDICINE

## 2024-08-12 PROCEDURE — 1123F ACP DISCUSS/DSCN MKR DOCD: CPT | Performed by: FAMILY MEDICINE

## 2024-08-12 NOTE — PROGRESS NOTES
Guera Osorio is a 88 y.o. female (: 1935) presenting to address:    Chief Complaint   Patient presents with    Hypertension       Vitals:    24 1511   BP: 120/60   Pulse: 57   Resp: 16   Temp: 98.2 °F (36.8 °C)   SpO2: 97%       \"Have you been to the ER, urgent care clinic since your last visit?  Hospitalized since your last visit?\"    NO    “Have you seen or consulted any other health care providers outside of [facility] since your last visit?”    NO

## 2024-08-12 NOTE — PATIENT INSTRUCTIONS
Current Status:  Hypertension well-controlled  Renal function status to be determined pending lab results  Dementia slowly progressing  Euthyroid on levothyroxine 75 mcg daily as of 1/22/2024  Anxiety and depression symptoms stable  Pulsatile tinnitus of left ear with subspecialty consultants not finding evidence of a condition requiring intervention  Continues to gradually lose weight    Health Maintenance Recommendations:  Influenza immunization recommended every fall    Plan:  Lab today (BMP, CBC)  Continue hydralazine 25 mg 3 times daily, losartan 100 mg daily, paroxetine 10 mg daily, levothyroxine 75 mcg daily, benazepril 10 mg daily diclofenac 1% gel topically 4 times daily as needed  Return for lab appointment followed by Medicare wellness evaluation appointment in January or sooner with any problems  Please always arrive at least 15 minutes before your scheduled appointment time.

## 2024-08-13 LAB
BASOPHILS # BLD AUTO: 0 X10E3/UL (ref 0–0.2)
BASOPHILS NFR BLD AUTO: 0 %
BUN SERPL-MCNC: 16 MG/DL (ref 8–27)
BUN/CREAT SERPL: 13 (ref 12–28)
CALCIUM SERPL-MCNC: 10.4 MG/DL (ref 8.7–10.3)
CHLORIDE SERPL-SCNC: 104 MMOL/L (ref 96–106)
CO2 SERPL-SCNC: 25 MMOL/L (ref 20–29)
CREAT SERPL-MCNC: 1.28 MG/DL (ref 0.57–1)
EGFRCR SERPLBLD CKD-EPI 2021: 40 ML/MIN/1.73
EOSINOPHIL # BLD AUTO: 0.1 X10E3/UL (ref 0–0.4)
EOSINOPHIL NFR BLD AUTO: 1 %
ERYTHROCYTE [DISTWIDTH] IN BLOOD BY AUTOMATED COUNT: 12.9 % (ref 11.7–15.4)
GLUCOSE SERPL-MCNC: 99 MG/DL (ref 70–99)
HCT VFR BLD AUTO: 34.5 % (ref 34–46.6)
HGB BLD-MCNC: 11.3 G/DL (ref 11.1–15.9)
IMM GRANULOCYTES # BLD AUTO: 0 X10E3/UL (ref 0–0.1)
IMM GRANULOCYTES NFR BLD AUTO: 0 %
LYMPHOCYTES # BLD AUTO: 1.6 X10E3/UL (ref 0.7–3.1)
LYMPHOCYTES NFR BLD AUTO: 20 %
MCH RBC QN AUTO: 29.6 PG (ref 26.6–33)
MCHC RBC AUTO-ENTMCNC: 32.8 G/DL (ref 31.5–35.7)
MCV RBC AUTO: 90 FL (ref 79–97)
MONOCYTES # BLD AUTO: 0.5 X10E3/UL (ref 0.1–0.9)
MONOCYTES NFR BLD AUTO: 6 %
NEUTROPHILS # BLD AUTO: 5.9 X10E3/UL (ref 1.4–7)
NEUTROPHILS NFR BLD AUTO: 73 %
PLATELET # BLD AUTO: 316 X10E3/UL (ref 150–450)
POTASSIUM SERPL-SCNC: 4.4 MMOL/L (ref 3.5–5.2)
RBC # BLD AUTO: 3.82 X10E6/UL (ref 3.77–5.28)
SODIUM SERPL-SCNC: 143 MMOL/L (ref 134–144)
WBC # BLD AUTO: 8.2 X10E3/UL (ref 3.4–10.8)

## 2024-09-18 RX ORDER — FUROSEMIDE 20 MG/1
TABLET ORAL
Qty: 90 TABLET | Refills: 1 | OUTPATIENT
Start: 2024-09-18

## 2025-01-05 DIAGNOSIS — I10 ESSENTIAL HYPERTENSION: Primary | ICD-10-CM

## 2025-01-05 DIAGNOSIS — E03.9 ACQUIRED HYPOTHYROIDISM: ICD-10-CM

## 2025-01-05 DIAGNOSIS — N18.32 STAGE 3B CHRONIC KIDNEY DISEASE (CKD) (HCC): ICD-10-CM

## 2025-01-06 SDOH — HEALTH STABILITY: PHYSICAL HEALTH: ON AVERAGE, HOW MANY DAYS PER WEEK DO YOU ENGAGE IN MODERATE TO STRENUOUS EXERCISE (LIKE A BRISK WALK)?: 0 DAYS

## 2025-01-06 SDOH — HEALTH STABILITY: PHYSICAL HEALTH: ON AVERAGE, HOW MANY MINUTES DO YOU ENGAGE IN EXERCISE AT THIS LEVEL?: 0 MIN

## 2025-01-06 ASSESSMENT — LIFESTYLE VARIABLES
HOW MANY STANDARD DRINKS CONTAINING ALCOHOL DO YOU HAVE ON A TYPICAL DAY: 1
HOW OFTEN DO YOU HAVE A DRINK CONTAINING ALCOHOL: 2-3 TIMES A WEEK
HOW MANY STANDARD DRINKS CONTAINING ALCOHOL DO YOU HAVE ON A TYPICAL DAY: 1 OR 2
HOW OFTEN DO YOU HAVE SIX OR MORE DRINKS ON ONE OCCASION: 1
HOW OFTEN DO YOU HAVE A DRINK CONTAINING ALCOHOL: 4

## 2025-01-06 ASSESSMENT — PATIENT HEALTH QUESTIONNAIRE - PHQ9
9. THOUGHTS THAT YOU WOULD BE BETTER OFF DEAD, OR OF HURTING YOURSELF: NOT AT ALL
8. MOVING OR SPEAKING SO SLOWLY THAT OTHER PEOPLE COULD HAVE NOTICED. OR THE OPPOSITE, BEING SO FIGETY OR RESTLESS THAT YOU HAVE BEEN MOVING AROUND A LOT MORE THAN USUAL: NEARLY EVERY DAY
SUM OF ALL RESPONSES TO PHQ QUESTIONS 1-9: 17
3. TROUBLE FALLING OR STAYING ASLEEP: NEARLY EVERY DAY
SUM OF ALL RESPONSES TO PHQ9 QUESTIONS 1 & 2: 4
4. FEELING TIRED OR HAVING LITTLE ENERGY: NEARLY EVERY DAY
SUM OF ALL RESPONSES TO PHQ QUESTIONS 1-9: 17
1. LITTLE INTEREST OR PLEASURE IN DOING THINGS: MORE THAN HALF THE DAYS
10. IF YOU CHECKED OFF ANY PROBLEMS, HOW DIFFICULT HAVE THESE PROBLEMS MADE IT FOR YOU TO DO YOUR WORK, TAKE CARE OF THINGS AT HOME, OR GET ALONG WITH OTHER PEOPLE: SOMEWHAT DIFFICULT
SUM OF ALL RESPONSES TO PHQ QUESTIONS 1-9: 17
6. FEELING BAD ABOUT YOURSELF - OR THAT YOU ARE A FAILURE OR HAVE LET YOURSELF OR YOUR FAMILY DOWN: NOT AT ALL
5. POOR APPETITE OR OVEREATING: MORE THAN HALF THE DAYS
2. FEELING DOWN, DEPRESSED OR HOPELESS: MORE THAN HALF THE DAYS
SUM OF ALL RESPONSES TO PHQ QUESTIONS 1-9: 17
7. TROUBLE CONCENTRATING ON THINGS, SUCH AS READING THE NEWSPAPER OR WATCHING TELEVISION: MORE THAN HALF THE DAYS

## 2025-01-08 DIAGNOSIS — I10 ESSENTIAL HYPERTENSION: ICD-10-CM

## 2025-01-08 DIAGNOSIS — E03.9 ACQUIRED HYPOTHYROIDISM: ICD-10-CM

## 2025-01-08 DIAGNOSIS — N18.32 STAGE 3B CHRONIC KIDNEY DISEASE (CKD) (HCC): Primary | ICD-10-CM

## 2025-01-08 NOTE — PROGRESS NOTES
(CENTRUM SILVER ADULT 50+) TABS Take 1 tablet by mouth daily Yes Provider, MD Nadeem   dilTIAZem (CARDIZEM CD) 240 MG extended release capsule TAKE 1 CAPSULE BY MOUTH DAILY Yes Polo Issa MD   levothyroxine (SYNTHROID) 75 MCG tablet TAKE 1 TABLET BY MOUTH DAILY BEFORE BREAKFAST Yes Polo Issa MD   losartan (COZAAR) 100 MG tablet TAKE 1 TABLET BY MOUTH DAILY Yes Polo Issa MD   hydrALAZINE (APRESOLINE) 25 MG tablet TAKE 1 TABLET BY MOUTH THREE TIMES DAILY Yes Polo Issa MD   diclofenac sodium (VOLTAREN) 1 % GEL Apply topically 4 times daily Yes Automatic Reconciliation, Ar   donepezil (ARICEPT) 10 MG tablet Take 2 tablets by mouth daily Yes Automatic Reconciliation, Ar   PARoxetine (PAXIL) 10 MG tablet Take 1 tablet by mouth daily Yes Automatic Reconciliation, Ar       CareTeam (Including outside providers/suppliers regularly involved in providing care):   Patient Care Team:  Polo Issa MD as PCP - General  Polo sIsa MD as PCP - Empaneled Provider     Recommendations for Preventive Services Due: see orders and patient instructions/AVS.  Recommended screening schedule for the next 5-10 years is provided to the patient in written form: see Patient Instructions/AVS.    Personalized preventative care plan/recommendations:  Influenza immunization previously completed  Shingrix immunization series and RSV immunization-available at the pharmacy  Medicare wellness evaluation due January 2026  Please always arrive at least 15 minutes before your scheduled appointment time.     Reviewed and updated this visit:  Tobacco  Allergies  Meds  Med Hx  Surg Hx  Soc Hx  Fam Hx

## 2025-01-09 ENCOUNTER — OFFICE VISIT (OUTPATIENT)
Dept: FAMILY MEDICINE CLINIC | Facility: CLINIC | Age: 89
End: 2025-01-09

## 2025-01-09 ENCOUNTER — TELEPHONE (OUTPATIENT)
Dept: FAMILY MEDICINE CLINIC | Facility: CLINIC | Age: 89
End: 2025-01-09

## 2025-01-09 VITALS
BODY MASS INDEX: 19.88 KG/M2 | HEART RATE: 52 BPM | HEIGHT: 62 IN | SYSTOLIC BLOOD PRESSURE: 124 MMHG | DIASTOLIC BLOOD PRESSURE: 70 MMHG | TEMPERATURE: 97.6 F | OXYGEN SATURATION: 98 % | WEIGHT: 108 LBS | RESPIRATION RATE: 16 BRPM

## 2025-01-09 DIAGNOSIS — F03.90 DEMENTIA WITHOUT BEHAVIORAL DISTURBANCE (HCC): ICD-10-CM

## 2025-01-09 DIAGNOSIS — E03.9 ACQUIRED HYPOTHYROIDISM: ICD-10-CM

## 2025-01-09 DIAGNOSIS — Z00.00 MEDICARE ANNUAL WELLNESS VISIT, SUBSEQUENT: ICD-10-CM

## 2025-01-09 DIAGNOSIS — J44.9 CHRONIC OBSTRUCTIVE PULMONARY DISEASE, UNSPECIFIED COPD TYPE (HCC): ICD-10-CM

## 2025-01-09 DIAGNOSIS — I10 ESSENTIAL HYPERTENSION: Primary | ICD-10-CM

## 2025-01-09 DIAGNOSIS — N18.32 STAGE 3B CHRONIC KIDNEY DISEASE (CKD) (HCC): ICD-10-CM

## 2025-01-09 DIAGNOSIS — H93.A2 PULSATILE TINNITUS OF LEFT EAR: ICD-10-CM

## 2025-01-09 SDOH — ECONOMIC STABILITY: FOOD INSECURITY: WITHIN THE PAST 12 MONTHS, YOU WORRIED THAT YOUR FOOD WOULD RUN OUT BEFORE YOU GOT MONEY TO BUY MORE.: NEVER TRUE

## 2025-01-09 SDOH — ECONOMIC STABILITY: FOOD INSECURITY: WITHIN THE PAST 12 MONTHS, THE FOOD YOU BOUGHT JUST DIDN'T LAST AND YOU DIDN'T HAVE MONEY TO GET MORE.: NEVER TRUE

## 2025-01-09 ASSESSMENT — ENCOUNTER SYMPTOMS: SHORTNESS OF BREATH: 0

## 2025-01-09 NOTE — PATIENT INSTRUCTIONS
saturated and trans fats. They increase your risk of heart disease by raising cholesterol levels.     Limit the amount of solid fat--butter, margarine, and shortening--you eat. Use olive, peanut, or canola oil when you cook. Bake, broil, and steam foods instead of frying them.     Eat a variety of fruit and vegetables every day. Dark green, deep orange, red, or yellow fruits and vegetables are especially good for you. Examples include spinach, carrots, peaches, and berries.     Foods high in fiber can reduce your cholesterol and provide important vitamins and minerals. High-fiber foods include whole-grain cereals and breads, oatmeal, beans, brown rice, citrus fruits, and apples.     Eat lean proteins. Heart-healthy proteins include seafood, lean meats and poultry, eggs, beans, peas, nuts, seeds, and soy products.     Limit drinks and foods with added sugar. These include candy, desserts, and soda pop.   Heart-healthy lifestyle    If your doctor recommends it, get more exercise. For many people, walking is a good choice. Or you may want to swim, bike, or do other activities. Bit by bit, increase the time you're active every day. Try for at least 30 minutes on most days of the week.     Try to quit or cut back on using tobacco and other nicotine products. This includes smoking and vaping. If you need help quitting, talk to your doctor about stop-smoking programs and medicines. These can increase your chances of quitting for good. Quitting is one of the most important things you can do to protect your heart. It is never too late to quit. Try to avoid secondhand smoke too.     Stay at a weight that's healthy for you. Talk to your doctor if you need help losing weight.     Try to get 7 to 9 hours of sleep each night.     Limit alcohol to 2 drinks a day for men and 1 drink a day for women. Too much alcohol can cause health problems.     Manage other health problems such as diabetes, high blood pressure, and high

## 2025-01-09 NOTE — TELEPHONE ENCOUNTER
Patient scheduled for labs and follow up in 6 months if patient will be due for labs please place orders if not will call to cancel appt

## 2025-01-10 LAB
ALBUMIN SERPL-MCNC: 4.3 G/DL (ref 3.7–4.7)
ALP SERPL-CCNC: 74 IU/L (ref 44–121)
ALT SERPL-CCNC: 5 IU/L (ref 0–32)
AST SERPL-CCNC: 15 IU/L (ref 0–40)
BASOPHILS # BLD AUTO: 0 X10E3/UL (ref 0–0.2)
BASOPHILS NFR BLD AUTO: 1 %
BILIRUB SERPL-MCNC: 0.3 MG/DL (ref 0–1.2)
BUN SERPL-MCNC: 16 MG/DL (ref 8–27)
BUN/CREAT SERPL: 16 (ref 12–28)
CALCIUM SERPL-MCNC: 9.7 MG/DL (ref 8.7–10.3)
CHLORIDE SERPL-SCNC: 104 MMOL/L (ref 96–106)
CO2 SERPL-SCNC: 23 MMOL/L (ref 20–29)
CREAT SERPL-MCNC: 1.02 MG/DL (ref 0.57–1)
EGFRCR SERPLBLD CKD-EPI 2021: 53 ML/MIN/1.73
EOSINOPHIL # BLD AUTO: 0.1 X10E3/UL (ref 0–0.4)
EOSINOPHIL NFR BLD AUTO: 2 %
ERYTHROCYTE [DISTWIDTH] IN BLOOD BY AUTOMATED COUNT: 13.5 % (ref 11.7–15.4)
GLOBULIN SER CALC-MCNC: 2.2 G/DL (ref 1.5–4.5)
GLUCOSE SERPL-MCNC: 97 MG/DL (ref 70–99)
HCT VFR BLD AUTO: 34.7 % (ref 34–46.6)
HGB BLD-MCNC: 11.6 G/DL (ref 11.1–15.9)
IMM GRANULOCYTES # BLD AUTO: 0 X10E3/UL (ref 0–0.1)
IMM GRANULOCYTES NFR BLD AUTO: 0 %
LYMPHOCYTES # BLD AUTO: 1.7 X10E3/UL (ref 0.7–3.1)
LYMPHOCYTES NFR BLD AUTO: 32 %
MCH RBC QN AUTO: 30.7 PG (ref 26.6–33)
MCHC RBC AUTO-ENTMCNC: 33.4 G/DL (ref 31.5–35.7)
MCV RBC AUTO: 92 FL (ref 79–97)
MONOCYTES # BLD AUTO: 0.4 X10E3/UL (ref 0.1–0.9)
MONOCYTES NFR BLD AUTO: 8 %
NEUTROPHILS # BLD AUTO: 3 X10E3/UL (ref 1.4–7)
NEUTROPHILS NFR BLD AUTO: 57 %
PLATELET # BLD AUTO: 280 X10E3/UL (ref 150–450)
POTASSIUM SERPL-SCNC: 4.2 MMOL/L (ref 3.5–5.2)
PROT SERPL-MCNC: 6.5 G/DL (ref 6–8.5)
RBC # BLD AUTO: 3.78 X10E6/UL (ref 3.77–5.28)
SODIUM SERPL-SCNC: 142 MMOL/L (ref 134–144)
SPECIMEN STATUS REPORT: NORMAL
T4 FREE SERPL-MCNC: 1.06 NG/DL (ref 0.82–1.77)
TSH SERPL DL<=0.005 MIU/L-ACNC: 2.37 UIU/ML (ref 0.45–4.5)
WBC # BLD AUTO: 5.2 X10E3/UL (ref 3.4–10.8)

## 2025-03-18 RX ORDER — LEVOTHYROXINE SODIUM 75 UG/1
75 TABLET ORAL
Qty: 90 TABLET | Refills: 3 | Status: SHIPPED | OUTPATIENT
Start: 2025-03-18

## 2025-03-18 RX ORDER — LOSARTAN POTASSIUM 100 MG/1
100 TABLET ORAL DAILY
Qty: 90 TABLET | Refills: 3 | Status: SHIPPED | OUTPATIENT
Start: 2025-03-18

## 2025-03-18 RX ORDER — HYDRALAZINE HYDROCHLORIDE 25 MG/1
25 TABLET, FILM COATED ORAL 3 TIMES DAILY
Qty: 270 TABLET | Refills: 3 | Status: SHIPPED | OUTPATIENT
Start: 2025-03-18

## 2025-03-18 RX ORDER — DILTIAZEM HYDROCHLORIDE 240 MG/1
240 CAPSULE, COATED, EXTENDED RELEASE ORAL DAILY
Qty: 90 CAPSULE | Refills: 3 | Status: SHIPPED | OUTPATIENT
Start: 2025-03-18

## 2025-03-18 NOTE — TELEPHONE ENCOUNTER
Levothyroxine, losartan, Hydralazine last refilled 12/29/23 for 90 with 3 refills.Diltiazem last refilled 1/16/24 for 90 with 3 refills.last ov  1/9/25   Future Appointments   Date Time Provider Department Center   7/23/2025  3:30 PM [facility]

## 2025-05-21 ENCOUNTER — TELEPHONE (OUTPATIENT)
Dept: FAMILY MEDICINE CLINIC | Facility: CLINIC | Age: 89
End: 2025-05-21

## 2025-05-21 NOTE — TELEPHONE ENCOUNTER
PT called in regarding medication refill and wanted to know if Dr. Issa can refill medication donepezil because she can't get in contact with the

## 2025-05-22 NOTE — TELEPHONE ENCOUNTER
I am not able to find Neurology notes in patients chart. I called daughter to see if she was able to get in contact with the doctors office.asked if she is still having an issue to call the office back.

## 2025-07-07 ENCOUNTER — OFFICE VISIT (OUTPATIENT)
Dept: FAMILY MEDICINE CLINIC | Facility: CLINIC | Age: 89
End: 2025-07-07
Payer: MEDICARE

## 2025-07-07 VITALS
TEMPERATURE: 98.1 F | RESPIRATION RATE: 16 BRPM | SYSTOLIC BLOOD PRESSURE: 120 MMHG | DIASTOLIC BLOOD PRESSURE: 62 MMHG | OXYGEN SATURATION: 98 % | WEIGHT: 102 LBS | BODY MASS INDEX: 18.77 KG/M2 | HEART RATE: 51 BPM | HEIGHT: 62 IN

## 2025-07-07 DIAGNOSIS — J44.9 CHRONIC OBSTRUCTIVE PULMONARY DISEASE, UNSPECIFIED COPD TYPE (HCC): ICD-10-CM

## 2025-07-07 DIAGNOSIS — F41.9 ANXIETY AND DEPRESSION: ICD-10-CM

## 2025-07-07 DIAGNOSIS — F03.90 DEMENTIA WITHOUT BEHAVIORAL DISTURBANCE (HCC): ICD-10-CM

## 2025-07-07 DIAGNOSIS — F32.A ANXIETY AND DEPRESSION: ICD-10-CM

## 2025-07-07 DIAGNOSIS — I10 ESSENTIAL HYPERTENSION: Primary | ICD-10-CM

## 2025-07-07 DIAGNOSIS — N18.32 STAGE 3B CHRONIC KIDNEY DISEASE (CKD) (HCC): ICD-10-CM

## 2025-07-07 DIAGNOSIS — E03.9 ACQUIRED HYPOTHYROIDISM: ICD-10-CM

## 2025-07-07 DIAGNOSIS — H93.A2 PULSATILE TINNITUS OF LEFT EAR: ICD-10-CM

## 2025-07-07 PROCEDURE — 1160F RVW MEDS BY RX/DR IN RCRD: CPT | Performed by: FAMILY MEDICINE

## 2025-07-07 PROCEDURE — 1126F AMNT PAIN NOTED NONE PRSNT: CPT | Performed by: FAMILY MEDICINE

## 2025-07-07 PROCEDURE — 1123F ACP DISCUSS/DSCN MKR DOCD: CPT | Performed by: FAMILY MEDICINE

## 2025-07-07 PROCEDURE — 1159F MED LIST DOCD IN RCRD: CPT | Performed by: FAMILY MEDICINE

## 2025-07-07 PROCEDURE — 99213 OFFICE O/P EST LOW 20 MIN: CPT | Performed by: FAMILY MEDICINE

## 2025-07-07 ASSESSMENT — ENCOUNTER SYMPTOMS
CHEST TIGHTNESS: 0
SHORTNESS OF BREATH: 0

## 2025-07-07 NOTE — PATIENT INSTRUCTIONS
Current Status:  Hypertension well-controlled  Renal function stable  Normal TSH on levothyroxine 75 mcg daily  COPD symptoms stable  Dementia symptoms stable some progression  Anxiety and depression symptoms stable  Pulsatile tinnitus left ear unchanged, followed by otolaryngology    Health maintenance recommendations:  Influenza immunization every fall  Shingrix immunization series, COVID-19 immunization booster and RSV immunization-available at the pharmacy    Plan:  Continue current medications  The patient will continue care with [facility] going forward  Please always arrive at least 15 minutes before your scheduled appointment time.

## 2025-07-07 NOTE — PROGRESS NOTES
Guera Osorio is a 89 y.o. female (: 1935) presenting to address:    Chief Complaint   Patient presents with    Follow-up     6 month fu        Vitals:    25 1340   BP: 120/62   Pulse: 51   Resp: 16   Temp: 98.1 °F (36.7 °C)   SpO2: 98%       Have you been to the ER, urgent care clinic since your last visit?  Hospitalized since your last visit?   NO    Have you seen or consulted any other health care providers outside our system since your last visit?   Tere castrejon              
[phone] 06/23/2025 12:38:16   06/19/2025 06/19/2025 CHEM 14 total CO2 27 mmol/L 21-31 normal   Not Available [facility]  [address] [place], [address], Ph [phone] 06/23/2025 12:38:16   06/19/2025 06/19/2025 CHEM 14 calculated serum osmolality 290 mOsm/kg 285-295 normal   Not Available [facility]  [address] [place], [address], Ph [phone] 06/23/2025 12:38:16   06/19/2025 06/19/2025 CHEM 14 anion gap 10.0 mEq/L 10.0-20.0 normal   Not Available [facility]  [address] [place], [address], Ph [phone] 06/23/2025 12:38:16   06/19/2025 06/19/2025 CHEM 14 albumin 3.8 g/dL 3.5-5.7 normal   Not Available [facility]  [address] [place], [address], Ph [phone] 06/23/2025 12:38:16   06/19/2025 06/19/2025 CHEM 14 total protein 5.7 g/dL 6.4-8.9 low   Not Available [facility]  [address] [place], [address], Ph [phone] 06/23/2025 12:38:16   06/19/2025 06/19/2025 CHEM 14 globulin 1.9 g/dL 2.3-3.7 low   Not Available [facility]  [address] [place], [address], Ph [phone] 06/23/2025 12:38:16   06/19/2025 06/19/2025 CHEM 14 albumin/globulin ratio 2.0   1.1-2.2 normal   Not Available [facility]  [address] [place], [address], Ph [phone] 06/23/2025 12:38:16   06/19/2025 06/19/2025 CHEM 14 alk phos 55 U/L  normal   Not Available [facility]  [address] [place], [address], Ph [phone] 06/23/2025 12:38:16   06/19/2025 06/19/2025 CHEM 14 SGOT/AST 14 U/L 13-39 normal   Not Available [facility]  [address] [place], [address], Ph [phone] 06/23/2025 12:38:16   06/19/2025 06/19/2025 CHEM 14 SGPT/ALT 9 U/L 7-52 normal   Not Available [facility]  [address] [place], [address], Ph [phone] 06/23/2025 12:38:16   06/19/2025 06/19/2025 CHEM 14 total bilirubin 0.5 mg/dL 0.3-1.0 normal   Not Available [facility]  [address] [place], [address], Ph [phone] 06/23/2025 12:38:16   06/19/2025